# Patient Record
Sex: FEMALE | Race: BLACK OR AFRICAN AMERICAN | NOT HISPANIC OR LATINO
[De-identification: names, ages, dates, MRNs, and addresses within clinical notes are randomized per-mention and may not be internally consistent; named-entity substitution may affect disease eponyms.]

---

## 2023-10-10 PROBLEM — Z00.00 ENCOUNTER FOR PREVENTIVE HEALTH EXAMINATION: Status: ACTIVE | Noted: 2023-10-10

## 2023-10-18 ENCOUNTER — APPOINTMENT (OUTPATIENT)
Dept: NEUROLOGY | Facility: CLINIC | Age: 38
End: 2023-10-18
Payer: MEDICAID

## 2023-10-18 ENCOUNTER — NON-APPOINTMENT (OUTPATIENT)
Age: 38
End: 2023-10-18

## 2023-10-18 VITALS
HEIGHT: 61 IN | WEIGHT: 166 LBS | SYSTOLIC BLOOD PRESSURE: 104 MMHG | TEMPERATURE: 98.1 F | BODY MASS INDEX: 31.34 KG/M2 | OXYGEN SATURATION: 98 % | DIASTOLIC BLOOD PRESSURE: 70 MMHG | HEART RATE: 85 BPM

## 2023-10-18 DIAGNOSIS — A09 INFECTIOUS GASTROENTERITIS AND COLITIS, UNSPECIFIED: ICD-10-CM

## 2023-10-18 DIAGNOSIS — Z86.2 PERSONAL HISTORY OF DISEASES OF THE BLOOD AND BLOOD-FORMING ORGANS AND CERTAIN DISORDERS INVOLVING THE IMMUNE MECHANISM: ICD-10-CM

## 2023-10-18 DIAGNOSIS — M54.16 RADICULOPATHY, LUMBAR REGION: ICD-10-CM

## 2023-10-18 DIAGNOSIS — Z84.0 FAMILY HISTORY OF DISEASES OF THE SKIN AND SUBCUTANEOUS TISSUE: ICD-10-CM

## 2023-10-18 PROCEDURE — 99204 OFFICE O/P NEW MOD 45 MIN: CPT

## 2023-11-02 ENCOUNTER — OUTPATIENT (OUTPATIENT)
Dept: OUTPATIENT SERVICES | Facility: HOSPITAL | Age: 38
LOS: 1 days | End: 2023-11-02
Payer: MEDICAID

## 2023-11-02 ENCOUNTER — RESULT REVIEW (OUTPATIENT)
Age: 38
End: 2023-11-02

## 2023-11-02 ENCOUNTER — APPOINTMENT (OUTPATIENT)
Dept: MRI IMAGING | Facility: HOSPITAL | Age: 38
End: 2023-11-02

## 2023-11-02 PROCEDURE — 72148 MRI LUMBAR SPINE W/O DYE: CPT

## 2023-11-02 PROCEDURE — 72148 MRI LUMBAR SPINE W/O DYE: CPT | Mod: 26

## 2023-11-09 ENCOUNTER — APPOINTMENT (OUTPATIENT)
Dept: NEUROLOGY | Facility: CLINIC | Age: 38
End: 2023-11-09
Payer: MEDICAID

## 2023-11-09 PROCEDURE — 99214 OFFICE O/P EST MOD 30 MIN: CPT | Mod: 95

## 2024-01-30 ENCOUNTER — OUTPATIENT (OUTPATIENT)
Dept: OUTPATIENT SERVICES | Facility: HOSPITAL | Age: 39
LOS: 1 days | End: 2024-01-30
Payer: MEDICAID

## 2024-01-30 ENCOUNTER — APPOINTMENT (OUTPATIENT)
Dept: SPINE | Facility: CLINIC | Age: 39
End: 2024-01-30
Payer: MEDICAID

## 2024-01-30 VITALS
RESPIRATION RATE: 16 BRPM | TEMPERATURE: 98.6 F | SYSTOLIC BLOOD PRESSURE: 107 MMHG | HEIGHT: 62 IN | OXYGEN SATURATION: 99 % | BODY MASS INDEX: 31.83 KG/M2 | WEIGHT: 173 LBS | HEART RATE: 82 BPM | DIASTOLIC BLOOD PRESSURE: 73 MMHG

## 2024-01-30 PROCEDURE — 72114 X-RAY EXAM L-S SPINE BENDING: CPT | Mod: 26

## 2024-01-30 PROCEDURE — 72114 X-RAY EXAM L-S SPINE BENDING: CPT

## 2024-01-30 PROCEDURE — 99205 OFFICE O/P NEW HI 60 MIN: CPT

## 2024-01-30 NOTE — REVIEW OF SYSTEMS
[Numbness] : numbness [Tingling] : tingling [Abnormal Sensation] : an abnormal sensation [As Noted in HPI] : as noted in HPI [Negative] : Respiratory

## 2024-01-30 NOTE — RESULTS/DATA
[FreeTextEntry1] : PROCEDURE DATE:  11/02/2023 INTERPRETATION:  CLINICAL INDICATION: Left L5 radiculopathy, suspect disc herniation. TECHNIQUE: Multiplanar multisequence MRI of the lumbar spine was performed without the administration of intravenous contrast, according to standard protocol. COMPARISON: None available. FINDINGS: ALIGNMENT: There is a normal lumbar lordosis. There is grade 1/2 anterolisthesis of L5 on S1. VERTEBRAE: The vertebral bodies are normal in height. There is no fracture or aggressive osseous lesion. There are likely bilateral L5 pars interarticularis defects. DISCS: There is advanced degenerative loss of intervertebral disc space height at L5-S1 level. CONUS MEDULLARIS AND CAUDA EQUINA: The conus medullaris terminates at L1. There is normal appearance of the conus medullaris and cauda equina. PARAVERTEBRAL SOFT TISSUES AND VISUALIZED RETROPERITONEUM: The visualized paravertebral soft tissues appear within normal limits. Trace ascites within the pelvis, likely physiologic. EVALUATION OF INDIVIDUAL LEVELS: L1-2: No disc herniation, spinal canal or neuroforaminal stenosis. L2-3: No disc herniation, spinal canal or neuroforaminal stenosis. L3-4: No disc herniation, spinal canal or neuroforaminal stenosis. L4-5: No disc herniation, spinal canal or neuroforaminal stenosis. L5-S1: There is grade 1/2 anterolisthesis of L5-S1, with uncovering of the posterior disc and degenerative changes of the bilateral facet joints resulting in bilateral moderate to severe neuroforaminal narrowing. No spinal canal stenosis. LIMITED EVALUATION OF UPPER SACRUM AND SACROILIAC JOINTS: Unremarkable. IMPRESSION: Grade 1/2 anterolisthesis of L5 on S1, with bilateral L5 pars interarticularis defects. CT lumbar spine may be obtained for further assessment.

## 2024-01-30 NOTE — ASSESSMENT
[FreeTextEntry1] : Plan: - ALIF L5-S1 recommended  - CT lumbar spine  - PT and TERRANCE will not help BL leg symptoms.  Patient cannot tolerate due to pain and progressive neurologic deficit - risks/benefits discussed. all questions answered.  - Tentative surgery date of 2/21/24.

## 2024-01-30 NOTE — PHYSICAL EXAM
[General Appearance - Alert] : alert [General Appearance - Well Nourished] : well nourished [Oriented To Time, Place, And Person] : oriented to person, place, and time [Neck Appearance] : the appearance of the neck was normal [] : no respiratory distress [Apical Impulse] : the apical impulse was normal

## 2024-01-30 NOTE — HISTORY OF PRESENT ILLNESS
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   Today 1/30/24 Pt complains of low back pain radiating down BL legs. Pt complains of pain, numbness and tingling in both legs down to toes. Pain started in left leg now is in both legs. Pt states she cannot sleep at night d/t pain and cannot lay flat on her back. Pt cannot sit or stand for too long. Pt denies any recent falls, bowel/bladder incontinence. Pt is ambulating today without assistive device. MRI and Xray Reviewed. L5 congenital spondylolysis noted.

## 2024-02-06 ENCOUNTER — APPOINTMENT (OUTPATIENT)
Dept: CT IMAGING | Facility: CLINIC | Age: 39
End: 2024-02-06
Payer: MEDICAID

## 2024-02-06 ENCOUNTER — OUTPATIENT (OUTPATIENT)
Dept: OUTPATIENT SERVICES | Facility: HOSPITAL | Age: 39
LOS: 1 days | End: 2024-02-06

## 2024-02-06 PROCEDURE — 72131 CT LUMBAR SPINE W/O DYE: CPT | Mod: 26

## 2024-02-07 ENCOUNTER — APPOINTMENT (OUTPATIENT)
Dept: NEUROLOGY | Facility: CLINIC | Age: 39
End: 2024-02-07
Payer: MEDICAID

## 2024-02-07 VITALS
HEIGHT: 62 IN | DIASTOLIC BLOOD PRESSURE: 75 MMHG | TEMPERATURE: 98 F | HEART RATE: 80 BPM | BODY MASS INDEX: 32.39 KG/M2 | WEIGHT: 176 LBS | OXYGEN SATURATION: 99 % | SYSTOLIC BLOOD PRESSURE: 111 MMHG

## 2024-02-07 PROCEDURE — 99215 OFFICE O/P EST HI 40 MIN: CPT

## 2024-02-07 PROCEDURE — G2211 COMPLEX E/M VISIT ADD ON: CPT | Mod: NC,1L

## 2024-02-07 NOTE — ASSESSMENT
[FreeTextEntry1] : 38 year old woman with radicular back pain for about 6 months compatible with left L5 lumbar radiculopathy, likely due to intevertebral disc herniation with preserved motor function and reflexes but some residual left L5 distribution sensory loss. There is significant protrusion of the intervertebral disc related to the anterior listhesis at L5-S1 correlating with her symptoms. She is scheduled for a procedure with neurosurgery to address the anterior listhesis.  Plan Follow up spine neurosurgery Physiatry and physical therapy referral Previously declined trial of gabapentin, unlikely to benefit numbness but may be helpful for neuropathic pain if symptoms persist.  RTC as needed or if there is a change in her neurological condition

## 2024-02-07 NOTE — PHYSICAL EXAM
[FreeTextEntry1] : Vitals: unrevealing  Exam:  AO3. Normally conversant. full affect. Follows commands, names, and repeats. Good attention.  PERRL, VFF, EOMI, no nystagmus, face symmetric, TUP at midline.  Motor:  R: L: Del 5 5 Bi 5 5 Tri 5 5 Wrist Extensors 5 5 Finger abductors 5 5  5 5  HF 5 5 KE 5 5 KF 5 5 DF 5 5 PF 5 5  Tone R L UE 0 0 LE 0 0  Sensory RUE LUE RLE LLE LT + + + + Vib + + + + JPS + + + + PP + + + - Temp + + + -  left L5 distribution sensory loss to PP and temp  Reflexes:  R L Biceps 2 2 BR 2 2 Pat 2 2 AJ 2 2  TOES F F  Coordination:  R L FTN 0 0 LORAINE 0 0 HTS 0 0   Gait: normal, can heel, toe, tandem.  [Over the Past 2 Weeks, Have You Felt Down, Depressed, or Hopeless?] : 1.) Over the past 2 weeks, have you felt down, depressed, or hopeless? No [Over the Past 2 Weeks, Have You Felt Little Interest or Pleasure Doing Things?] : 2.) Over the past 2 weeks, have you felt little interest or pleasure doing things? No

## 2024-02-07 NOTE — DATA REVIEWED
[de-identified] : November 2, 2023 MRI lumbar spine without gadolinium demonstrated grade 1-2 anterolisthesis of L5-S1 with uncovering of the posterior disc and degenerative changes of the bilateral facet joints resulting in bilateral moderate to severe neuroforaminal narrowing.  No spinal canal stenosis. On my read there is significant protrusion of the intervertebral disc related to the anterior listhesis at L5-S1 correlating with her symptoms.

## 2024-02-07 NOTE — HISTORY OF PRESENT ILLNESS
[FreeTextEntry1] : 38 year old woman presenting with radicular back pain. Symptoms began several months ago with lower back pain with radiation down left leg to outer and top of right foot. There is also stiffness in the lower back. No weakness, bladder, bowel function. No recent weight loss. She walks for exercise, no heavy lifting or sentinel event.   February 7, 2024 Patient continues to experience radiating lower extremity pain, and neurogenic claudication symptoms. No bladder or bowel dysfunction or bonifacio weakness.   PMH eczema, sickle cell trait meds vitamin D NKDA SH no smoking or etoh FH sciatica in mom

## 2024-02-08 ENCOUNTER — APPOINTMENT (OUTPATIENT)
Dept: INTERNAL MEDICINE | Facility: CLINIC | Age: 39
End: 2024-02-08
Payer: MEDICAID

## 2024-02-08 ENCOUNTER — TRANSCRIPTION ENCOUNTER (OUTPATIENT)
Age: 39
End: 2024-02-08

## 2024-02-08 ENCOUNTER — NON-APPOINTMENT (OUTPATIENT)
Age: 39
End: 2024-02-08

## 2024-02-08 VITALS
BODY MASS INDEX: 32.09 KG/M2 | HEIGHT: 62 IN | HEART RATE: 77 BPM | DIASTOLIC BLOOD PRESSURE: 72 MMHG | WEIGHT: 174.38 LBS | TEMPERATURE: 98.2 F | SYSTOLIC BLOOD PRESSURE: 107 MMHG | OXYGEN SATURATION: 99 %

## 2024-02-08 PROCEDURE — 93000 ELECTROCARDIOGRAM COMPLETE: CPT

## 2024-02-08 PROCEDURE — 99213 OFFICE O/P EST LOW 20 MIN: CPT | Mod: 25

## 2024-02-09 ENCOUNTER — OUTPATIENT (OUTPATIENT)
Dept: OUTPATIENT SERVICES | Facility: HOSPITAL | Age: 39
LOS: 1 days | End: 2024-02-09
Payer: MEDICAID

## 2024-02-09 LAB
ALBUMIN SERPL ELPH-MCNC: 4.1 G/DL
ALP BLD-CCNC: 88 U/L
ALT SERPL-CCNC: 9 U/L
ANION GAP SERPL CALC-SCNC: 11 MMOL/L
APTT BLD: 30 SEC
AST SERPL-CCNC: 13 U/L
BASOPHILS # BLD AUTO: 0.05 K/UL
BASOPHILS NFR BLD AUTO: 0.7 %
BILIRUB SERPL-MCNC: 0.4 MG/DL
BUN SERPL-MCNC: 16 MG/DL
CALCIUM SERPL-MCNC: 9.5 MG/DL
CHLORIDE SERPL-SCNC: 104 MMOL/L
CHOLEST SERPL-MCNC: 211 MG/DL
CO2 SERPL-SCNC: 26 MMOL/L
CREAT SERPL-MCNC: 0.67 MG/DL
EGFR: 116 ML/MIN/1.73M2
EOSINOPHIL # BLD AUTO: 0.08 K/UL
EOSINOPHIL NFR BLD AUTO: 1.1 %
ESTIMATED AVERAGE GLUCOSE: 114 MG/DL
GLUCOSE SERPL-MCNC: 98 MG/DL
HBA1C MFR BLD HPLC: 5.6 %
HCG SERPL-MCNC: <1 MIU/ML
HCT VFR BLD CALC: 33.6 %
HDLC SERPL-MCNC: 60 MG/DL
HGB BLD-MCNC: 11.7 G/DL
IMM GRANULOCYTES NFR BLD AUTO: 0.4 %
INR PPP: 0.98 RATIO
LDLC SERPL CALC-MCNC: 138 MG/DL
LYMPHOCYTES # BLD AUTO: 2.45 K/UL
LYMPHOCYTES NFR BLD AUTO: 33.6 %
MAN DIFF?: NORMAL
MCHC RBC-ENTMCNC: 26.8 PG
MCHC RBC-ENTMCNC: 34.8 GM/DL
MCV RBC AUTO: 77.1 FL
MONOCYTES # BLD AUTO: 0.65 K/UL
MONOCYTES NFR BLD AUTO: 8.9 %
NEUTROPHILS # BLD AUTO: 4.03 K/UL
NEUTROPHILS NFR BLD AUTO: 55.3 %
NONHDLC SERPL-MCNC: 151 MG/DL
PLATELET # BLD AUTO: 254 K/UL
POTASSIUM SERPL-SCNC: 4 MMOL/L
PROT SERPL-MCNC: 7.1 G/DL
PT BLD: 11.1 SEC
RBC # BLD: 4.36 M/UL
RBC # FLD: 15.8 %
SODIUM SERPL-SCNC: 141 MMOL/L
TRIGL SERPL-MCNC: 73 MG/DL
WBC # FLD AUTO: 7.29 K/UL

## 2024-02-09 PROCEDURE — 71045 X-RAY EXAM CHEST 1 VIEW: CPT

## 2024-02-09 PROCEDURE — 71045 X-RAY EXAM CHEST 1 VIEW: CPT | Mod: 26

## 2024-02-09 NOTE — ASSESSMENT
[Patient Optimized for Surgery] : Patient optimized for surgery [No Further Testing Recommended] : no further testing recommended [FreeTextEntry4] : RCRI class 1, Casillas score 0.1% Patient is low risk for low risk procedure.  Labwork collected today - besides HLD (LDL of 132), labwork normal  EKG done NSR

## 2024-02-09 NOTE — HISTORY OF PRESENT ILLNESS
[No Pertinent Cardiac History] : no history of aortic stenosis, atrial fibrillation, coronary artery disease, recent myocardial infarction, or implantable device/pacemaker [No Pertinent Pulmonary History] : no history of asthma, COPD, sleep apnea, or smoking [No Adverse Anesthesia Reaction] : no adverse anesthesia reaction in self or family member [(Patient denies any chest pain, claudication, dyspnea on exertion, orthopnea, palpitations or syncope)] : Patient denies any chest pain, claudication, dyspnea on exertion, orthopnea, palpitations or syncope [Excellent (>10 METs)] : Excellent (>10 METs) [Chronic Anticoagulation] : no chronic anticoagulation [Chronic Kidney Disease] : no chronic kidney disease [Diabetes] : no diabetes [FreeTextEntry1] : ALIF L5-S1 [FreeTextEntry2] : 2/21/24 [FreeTextEntry3] : Dr. Sebastien Contreras [FreeTextEntry4] : 38F with no significant PMHX other than congenital spondylosis who presents for preoperative clearance for surgery.  She feels well today other than some low back pain.

## 2024-02-22 ENCOUNTER — APPOINTMENT (OUTPATIENT)
Dept: NEUROSURGERY | Facility: CLINIC | Age: 39
End: 2024-02-22

## 2024-03-21 ENCOUNTER — NON-APPOINTMENT (OUTPATIENT)
Age: 39
End: 2024-03-21

## 2024-03-25 ENCOUNTER — APPOINTMENT (OUTPATIENT)
Dept: NEUROSURGERY | Facility: CLINIC | Age: 39
End: 2024-03-25
Payer: MEDICAID

## 2024-03-25 VITALS
WEIGHT: 174 LBS | DIASTOLIC BLOOD PRESSURE: 74 MMHG | BODY MASS INDEX: 32.02 KG/M2 | HEIGHT: 62 IN | HEART RATE: 97 BPM | SYSTOLIC BLOOD PRESSURE: 111 MMHG | TEMPERATURE: 97.5 F | OXYGEN SATURATION: 98 % | RESPIRATION RATE: 18 BRPM

## 2024-03-25 PROCEDURE — 99215 OFFICE O/P EST HI 40 MIN: CPT

## 2024-03-29 ENCOUNTER — APPOINTMENT (OUTPATIENT)
Dept: INTERNAL MEDICINE | Facility: CLINIC | Age: 39
End: 2024-03-29

## 2024-04-05 ENCOUNTER — APPOINTMENT (OUTPATIENT)
Dept: INTERNAL MEDICINE | Facility: CLINIC | Age: 39
End: 2024-04-05
Payer: MEDICAID

## 2024-04-05 VITALS
RESPIRATION RATE: 17 BRPM | WEIGHT: 174 LBS | HEIGHT: 62 IN | OXYGEN SATURATION: 98 % | BODY MASS INDEX: 32.02 KG/M2 | HEART RATE: 87 BPM | SYSTOLIC BLOOD PRESSURE: 109 MMHG | DIASTOLIC BLOOD PRESSURE: 71 MMHG | TEMPERATURE: 97.9 F

## 2024-04-05 DIAGNOSIS — Z01.818 ENCOUNTER FOR OTHER PREPROCEDURAL EXAMINATION: ICD-10-CM

## 2024-04-05 DIAGNOSIS — D57.3 SICKLE-CELL TRAIT: ICD-10-CM

## 2024-04-05 DIAGNOSIS — L40.9 PSORIASIS, UNSPECIFIED: ICD-10-CM

## 2024-04-05 DIAGNOSIS — E66.9 OBESITY, UNSPECIFIED: ICD-10-CM

## 2024-04-05 DIAGNOSIS — K29.60 OTHER GASTRITIS W/OUT BLEEDING: ICD-10-CM

## 2024-04-05 DIAGNOSIS — Z83.3 FAMILY HISTORY OF DIABETES MELLITUS: ICD-10-CM

## 2024-04-05 PROCEDURE — 99214 OFFICE O/P EST MOD 30 MIN: CPT

## 2024-04-05 RX ORDER — DUPILUMAB 200 MG/1.14ML
INJECTION, SOLUTION SUBCUTANEOUS
Refills: 0 | Status: ACTIVE | COMMUNITY

## 2024-04-05 NOTE — HISTORY OF PRESENT ILLNESS
[No Pertinent Cardiac History] : no history of aortic stenosis, atrial fibrillation, coronary artery disease, recent myocardial infarction, or implantable device/pacemaker [No Pertinent Pulmonary History] : no history of asthma, COPD, sleep apnea, or smoking [(Patient denies any chest pain, claudication, dyspnea on exertion, orthopnea, palpitations or syncope)] : Patient denies any chest pain, claudication, dyspnea on exertion, orthopnea, palpitations or syncope [Moderate (4-6 METs)] : Moderate (4-6 METs) [Self] : no previous adverse anesthesia reaction [Chronic Anticoagulation] : no chronic anticoagulation [Chronic Kidney Disease] : no chronic kidney disease [Diabetes] : no diabetes [FreeTextEntry1] : 4-S1 posterior instrumented fusion with TLIF from right side [FreeTextEntry2] : 4/24/24 [FreeTextEntry3] : Dr. Newell [FreeTextEntry4] : Ms. KRISH CHARLES is a 38-year-old female with history of lumbo-sacral stenosis, psoriasis on Dupixent, and sickle trait resulting in neurogenic claudication and low back pain presenting today to the [facility] prior to 4-S1 posterior instrumented fusion with TLIF from right side. [FreeTextEntry8] : lives in 5th floor walk up, able to walk unlimited distance

## 2024-04-05 NOTE — ASSESSMENT
[High Risk Surgery - Intraperitoneal, Intrathoracic or Supringuinal Vascular Procedures] : High Risk Surgery - Intraperitoneal, Intrathoracic or Supringuinal Vascular Procedures - No (0) [Ischemic Heart Disease] : Ischemic Heart Disease - No (0) [Congestive Heart Failure] : Congestive Heart Failure - No (0) [Prior Cerebrovascular Accident or TIA] : Prior Cerebrovascular Accident or TIA - No (0) [Creatinine >= 2mg/dL (1 Point)] : Creatinine >= 2mg/dL - No (0) [Insulin-dependent Diabetic (1 Point)] : Insulin-dependent Diabetic - No (0) [0] : 0 , RCRI Class: I, Risk of Post-Op Cardiac Complications: 3.9%, 95% CI for Risk Estimate: 2.8% - 5.4% [Patient Optimized for Surgery] : Patient optimized for surgery [FreeTextEntry4] : Ms. KRISH CHARLES is a 38-year-old female with history of lumbo-sacral stenosis, psoriasis on Dupixent, and sickle trait resulting in neurogenic claudication and low back pain presenting today to the [facility] prior to 4-S1 posterior instrumented fusion with TLIF from right side.  Recent ECG, and labs independently reviewed, notable only for mildly elevated LDL. She is considered low risk for intermediate risk procedure.

## 2024-04-05 NOTE — PHYSICAL EXAM
[No Acute Distress] : no acute distress [Well-Appearing] : well-appearing [Normal Sclera/Conjunctiva] : normal sclera/conjunctiva [Normal Outer Ear/Nose] : the outer ears and nose were normal in appearance [No Edema] : there was no peripheral edema [Soft] : abdomen soft [Non Tender] : non-tender [Normal] : affect was normal and insight and judgment were intact

## 2024-04-08 LAB — HCG UR QL: NEGATIVE

## 2024-04-16 ENCOUNTER — APPOINTMENT (OUTPATIENT)
Dept: NEUROSURGERY | Facility: CLINIC | Age: 39
End: 2024-04-16
Payer: MEDICAID

## 2024-04-16 PROCEDURE — 99215 OFFICE O/P EST HI 40 MIN: CPT

## 2024-04-17 NOTE — HISTORY OF PRESENT ILLNESS
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 In 2022 back pain started radiating down both legs to feet.   1/30/24 Pt complains of low back pain radiating down BL legs. Pt complains of pain, numbness and tingling in both legs down to toes. Pain started in left leg now is in both legs. Pt states she cannot sleep at night d/t pain and cannot lay flat on her back. Pt cannot sit or stand for too long. Pt denies any recent falls, bowel/bladder incontinence. Pt is ambulating today without assistive device. MRI and Xray Reviewed. L5 congenital spondylolysis reviewed.  She had been scheduled for L5-S1 ALIF with Dr. Contreras and presents today to establish care.   Lower back pain is constant. Reports she stumbles often with ambulation, denies any falls. Denies weakness in legs. Does not take anything pain. Pain worse with ambulation, improves with rest.

## 2024-04-17 NOTE — REASON FOR VISIT
[Home] : at home, [unfilled] , at the time of the visit. [Medical Office: ([facility])___] : at the medical office located in  [Patient] : the patient [Follow-Up: _____] : a [unfilled] follow-up visit [FreeTextEntry1] : Patient with neurogenic claudication and low back pain due to lumbo-sacral stenosis, instability from pars fractures, and spondylolisthesis at L5-S1. Patient has significant b/l leg paresthesias and numbness worse on the right side. Symptoms become worse with walking. She has constant back pain. She has failed conservative measures over many years of worsening back pain which has now progressed to neurological worsening with loss of sensory function.

## 2024-04-17 NOTE — ADDENDUM
[FreeTextEntry1] : Patient with neurogenic claudication and low back pain due to lumbo-sacral stenosis, instability from pars fractures, and spondylolisthesis at L5-S1. Patient has significant b/l leg paresthesias and numbness worse on the right side. Symptoms become worse with walking. She has constant back pain. She has failed conservative measures over the year. Plan for L4-S1 posterior instrumented fusion with TLIF from right side. Risks of surgery including but not limited to CSF leak, nerve injury, paralysis, persistent pain, need for re-operation discussed. Patient understands and wishes to proceed with surgery.

## 2024-04-17 NOTE — ASSESSMENT
[FreeTextEntry1] : CT lumbar from 2/6/24 reviewed by Dr. Newell with patient showing bilateral chronic spondylolysis defects at L5 with grade 2 anterolisthesis.  Recommend L4-S1 decompression and fusion, TLIF Will set up patient at [facility] center for medical clearance  Will plan for surgery date 4/24/24  Risks, benefits and alternatives to surgery discussed. Multiple questions answered to patients satisfaction. Risks include but is not limited to infection, no resolution of symptoms/device failure. Education provided regarding pre-operative clearance requirements.  I, Dr. Newell, personally performed the evaluation and management (E/M) services for this new patient.  That E/M includes conducting the initial examination, assessing all conditions, and establishing the plan of care.  Today, my ACP, Shira Tapia, was here to observe my evaluation and management services for this patient to be followed going forward.   Patient and patient's family verbalize agreement and understanding with plan of care.

## 2024-04-17 NOTE — ASSESSMENT
[FreeTextEntry1] : CT lumbar from 2/6/24 and MRI lumbar from 11/2/2023 reviewed by Dr. Newell with patient showing bilateral chronic spondylolysis defects at L5 with grade 2 anterolisthesis. The severe worsening back pain and neurogenic claudication that Ms. Velez has corresponds to her obvious radiographic instability due to pars fractures bilaterally at L5. The spondylolysis at L5-S1 results in severe spinal stenosis at L5-S1 foramina bilaterally which severely compresses the nerve roots at that level. She is at risk for worsening spondylolisthesis which could result in acute neurological deterioration since her L5-S1 joint is unstable. This structural defects in the spine cannot be corrected by further physical therapy or pain management. Instrumented stabilization of the spine is imperative in an attempt to avoid futher neurological worsening.  Plan for L4-S1 posterior instrumented fusion with TLIF from right side at L5-S1. Risks of surgery including but not limited to CSF leak, nerve injury, paralysis, persistent pain, need for re-operation discussed. Patient understands and wishes to proceed with surgery.Will plan for surgery date 4/24/24  Risks, benefits and alternatives to surgery discussed. Multiple questions answered to patients satisfaction. Risks include but is not limited to infection, no resolution of symptoms/device failure. Education provided regarding pre-operative clearance requirements.  I, Dr. Newell, personally performed the evaluation and management (E/M) services for this new patient.  That E/M includes conducting the initial examination, assessing all conditions, and establishing the plan of care.  Today, my ACP, Carol Witt, was here to observe my evaluation and management services for this patient to be followed going forward.   Patient and patient's family verbalize agreement and understanding with plan of care.

## 2024-04-17 NOTE — PHYSICAL EXAM
[Motor Tone] : muscle tone was normal in all four extremities [Motor Strength] : muscle strength was normal in all four extremities [5] : S1 ankle flexors 5/5 [Abnormal Walk] : normal gait [Balance] : balance was intact [Intact] : all motor groups within normal limits of strength and tone bilaterally [FreeTextEntry7] : sensory loss b/l legs with paresthesias R>L [Antalgic] : antalgic [Neck Appearance] : the appearance of the neck was normal [] : no respiratory distress [FreeTextEntry1] : Antalgic gait [Skin Color & Pigmentation] : normal skin color and pigmentation

## 2024-04-17 NOTE — DATA REVIEWED
[de-identified] :   	 EXAM: [id number] - CT LUMBAR SPINE  - ORDERED BY: MATTHEW SAHU   PROCEDURE DATE:  02/06/2024    INTERPRETATION:  LUMBOSACRAL SPINE CT  CLINICAL INFORMATION: Low back pain. Spondylolisthesis. Preoperative exam. TECHNIQUE: Axial CT images were obtained of the lumbosacral spine with coronal and sagittal reconstructions. Comparison is made to prior study from 11/2/2023.  FINDINGS:  ALIGNMENT: Grade 2 anterolisthesis at L5-S1  DISC SPACES: Bilateral chronic spondylolysis defects at L5 with grade 2 anterolisthesis. Severe disc space narrowing with gas in the disc space and endplate sclerosis along with endplate osteophyte formation and disc bulging resulting in severe bilateral foraminal narrowing.  SI JOINTS: Minimal SI joint arthrosis. INTRAABDOMINAL AND INTRAPELVIC SOFT TISSUES: Normal  IMPRESSION: Bilateral chronic spondylolysis defects at L5 with grade 2 anterolisthesis, disc space narrowing and disc bulging resulting in severe bilateral foraminal narrowing.  --- End of Report ---       WALE ZULUAGA MD; Attending Radiologist This document has been electronically signed. Feb 7 2024 10:51PM

## 2024-04-17 NOTE — PHYSICAL EXAM
[General Appearance - Alert] : alert [General Appearance - In No Acute Distress] : in no acute distress [Sclera] : the sclera and conjunctiva were normal [Oriented To Time, Place, And Person] : oriented to person, place, and time [Outer Ear] : the ears and nose were normal in appearance [Neck Appearance] : the appearance of the neck was normal [] : no respiratory distress [FreeTextEntry6] : Motor: muscle tone was normal in all four extremities and muscle strength was normal in all four extremities.  Right: C5 deltoid 5/5, C5 Biceps 5/5, C6 wrist extensors 5/5, C7 triceps 5/5, C8 finger flexors 5/5, L2 Iliopsoas 5/5, L3 quadriceps 5/5, L4/5 ankle dorsiflexors 5/5, S1 ankle flexors 5/5. Left: C5 deltoid 5/5, C5 Biceps 5/5, C6 wrist extensors 5/5, C7 triceps 5/5, C8 finger flexors 5/5, L2 Iliopsoas 5/5, L3 quadriceps 5/5, L4/5 ankle dorsiflexors 5/5, S1 ankle flexors 5/5.   Sensory exam:. sensory loss b/l legs with paresthesias R>L.   Coordination:. balance was intact.   Spine:  Gait: antalgic   Motor Exam: all motor groups within normal limits of strength and tone bilaterally. Neck: the appearance of the neck was normal. Pulmonary: no respiratory distress. Back:. TTP low back. Musculoskeletal:. Antalgic gait. Skin: normal skin color and pigmentation.

## 2024-04-17 NOTE — DATA REVIEWED
[de-identified] : Lumbar spine CT 2/6/24 demonstrates Bilateral chronic spondylolysis defects at L5 with grade 2 anterolisthesis, disc space narrowing and disc bulging resulting in severe bilateral foraminal narrowing. [de-identified] : I have reviewed the most recent MRI 11/2/23 at [facility] which shows grade 1/2 anterolisthesis of L5-S1, with uncovering of the posterior disc and degenerative changes of the bilateral facet joints resulting in bilateral moderate to severe neuroforaminal narrowing. No spinal canal stenosis.

## 2024-04-17 NOTE — HISTORY OF PRESENT ILLNESS
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 In 2022 back pain started radiating down both legs to feet.   1/30/24 Pt complains of low back pain radiating down BL legs. Pt complains of pain, numbness and tingling in both legs down to toes. Pain started in left leg now is in both legs. Pt states she cannot sleep at night d/t pain and cannot lay flat on her back. Pt cannot sit or stand for too long. Pt denies any recent falls, bowel/bladder incontinence. Pt is ambulating today without assistive device. MRI and Xray Reviewed. L5 congenital spondylolysis reviewed.  She had been scheduled for L5-S1 ALIF with Dr. Contreras and presents today to establish care.   Lower back pain is constant. Reports she stumbles often with ambulation, denies any falls. Denies weakness in legs. Does not take anything pain. Pain worse with ambulation, improves with rest.

## 2024-04-23 ENCOUNTER — TRANSCRIPTION ENCOUNTER (OUTPATIENT)
Age: 39
End: 2024-04-23

## 2024-04-23 VITALS
SYSTOLIC BLOOD PRESSURE: 111 MMHG | OXYGEN SATURATION: 96 % | DIASTOLIC BLOOD PRESSURE: 77 MMHG | HEART RATE: 95 BPM | RESPIRATION RATE: 16 BRPM | WEIGHT: 177.25 LBS | TEMPERATURE: 98 F | HEIGHT: 61 IN

## 2024-04-23 RX ORDER — POVIDONE-IODINE 5 %
1 AEROSOL (ML) TOPICAL ONCE
Refills: 0 | Status: COMPLETED | OUTPATIENT
Start: 2024-04-24 | End: 2024-04-24

## 2024-04-23 NOTE — PATIENT PROFILE ADULT - FALL HARM RISK - UNIVERSAL INTERVENTIONS
Bed in lowest position, wheels locked, appropriate side rails in place/Call bell, personal items and telephone in reach/Instruct patient to call for assistance before getting out of bed or chair/Non-slip footwear when patient is out of bed/Amanda to call system/Physically safe environment - no spills, clutter or unnecessary equipment/Purposeful Proactive Rounding/Room/bathroom lighting operational, light cord in reach

## 2024-04-24 ENCOUNTER — APPOINTMENT (OUTPATIENT)
Dept: NEUROSURGERY | Facility: HOSPITAL | Age: 39
End: 2024-04-24

## 2024-04-24 ENCOUNTER — INPATIENT (INPATIENT)
Facility: HOSPITAL | Age: 39
LOS: 4 days | Discharge: HOME | DRG: 455 | End: 2024-04-29
Attending: NEUROLOGICAL SURGERY | Admitting: NEUROLOGICAL SURGERY
Payer: MEDICAID

## 2024-04-24 DIAGNOSIS — Z98.891 HISTORY OF UTERINE SCAR FROM PREVIOUS SURGERY: Chronic | ICD-10-CM

## 2024-04-24 LAB
ADD ON TEST-SPECIMEN IN LAB: SIGNIFICANT CHANGE UP
ANION GAP SERPL CALC-SCNC: 11 MMOL/L — SIGNIFICANT CHANGE UP (ref 5–17)
BLD GP AB SCN SERPL QL: NEGATIVE — SIGNIFICANT CHANGE UP
BUN SERPL-MCNC: 8 MG/DL — SIGNIFICANT CHANGE UP (ref 7–23)
CALCIUM SERPL-MCNC: 8.7 MG/DL — SIGNIFICANT CHANGE UP (ref 8.4–10.5)
CHLORIDE SERPL-SCNC: 105 MMOL/L — SIGNIFICANT CHANGE UP (ref 96–108)
CO2 SERPL-SCNC: 21 MMOL/L — LOW (ref 22–31)
CREAT SERPL-MCNC: 0.56 MG/DL — SIGNIFICANT CHANGE UP (ref 0.5–1.3)
EGFR: 121 ML/MIN/1.73M2 — SIGNIFICANT CHANGE UP
GLUCOSE SERPL-MCNC: 161 MG/DL — HIGH (ref 70–99)
HCT VFR BLD CALC: 30.1 % — LOW (ref 34.5–45)
HGB BLD-MCNC: 10.7 G/DL — LOW (ref 11.5–15.5)
MAGNESIUM SERPL-MCNC: 1.8 MG/DL — SIGNIFICANT CHANGE UP (ref 1.6–2.6)
MCHC RBC-ENTMCNC: 27.4 PG — SIGNIFICANT CHANGE UP (ref 27–34)
MCHC RBC-ENTMCNC: 35.5 GM/DL — SIGNIFICANT CHANGE UP (ref 32–36)
MCV RBC AUTO: 77.2 FL — LOW (ref 80–100)
NRBC # BLD: 0 /100 WBCS — SIGNIFICANT CHANGE UP (ref 0–0)
PHOSPHATE SERPL-MCNC: 3 MG/DL — SIGNIFICANT CHANGE UP (ref 2.5–4.5)
PLATELET # BLD AUTO: 224 K/UL — SIGNIFICANT CHANGE UP (ref 150–400)
POTASSIUM SERPL-MCNC: 4.4 MMOL/L — SIGNIFICANT CHANGE UP (ref 3.5–5.3)
POTASSIUM SERPL-SCNC: 4.4 MMOL/L — SIGNIFICANT CHANGE UP (ref 3.5–5.3)
RBC # BLD: 3.9 M/UL — SIGNIFICANT CHANGE UP (ref 3.8–5.2)
RBC # FLD: 15.1 % — HIGH (ref 10.3–14.5)
RH IG SCN BLD-IMP: POSITIVE — SIGNIFICANT CHANGE UP
SODIUM SERPL-SCNC: 137 MMOL/L — SIGNIFICANT CHANGE UP (ref 135–145)
WBC # BLD: 11.22 K/UL — HIGH (ref 3.8–10.5)
WBC # FLD AUTO: 11.22 K/UL — HIGH (ref 3.8–10.5)

## 2024-04-24 PROCEDURE — 63052 LAM FACETC/FRMT ARTHRD LUM 1: CPT

## 2024-04-24 PROCEDURE — 22853 INSJ BIOMECHANICAL DEVICE: CPT

## 2024-04-24 PROCEDURE — 99024 POSTOP FOLLOW-UP VISIT: CPT

## 2024-04-24 PROCEDURE — 61783 SCAN PROC SPINAL: CPT | Mod: 59

## 2024-04-24 PROCEDURE — 22840 INSERT SPINE FIXATION DEVICE: CPT

## 2024-04-24 PROCEDURE — 22633 ARTHRD CMBN 1NTRSPC LUMBAR: CPT

## 2024-04-24 DEVICE — SCREW SERRATO POLY 6.5X40MM: Type: IMPLANTABLE DEVICE | Status: FUNCTIONAL

## 2024-04-24 DEVICE — SURGIFOAM PAD 8CM X 12.5CM X 10MM (100): Type: IMPLANTABLE DEVICE | Status: FUNCTIONAL

## 2024-04-24 DEVICE — SURGIFLO HEMOSTATIC MATRIX KIT: Type: IMPLANTABLE DEVICE | Status: FUNCTIONAL

## 2024-04-24 DEVICE — GRAFT VITOSIS BIMODAL 10CC: Type: IMPLANTABLE DEVICE | Status: FUNCTIONAL

## 2024-04-24 DEVICE — IMPLANTABLE DEVICE: Type: IMPLANTABLE DEVICE | Status: FUNCTIONAL

## 2024-04-24 DEVICE — ROD 60MM: Type: IMPLANTABLE DEVICE | Status: FUNCTIONAL

## 2024-04-24 DEVICE — GRAFT BONE INFUSE KIT SM: Type: IMPLANTABLE DEVICE | Status: FUNCTIONAL

## 2024-04-24 DEVICE — SCREW BLOCKER BONE XIA: Type: IMPLANTABLE DEVICE | Status: FUNCTIONAL

## 2024-04-24 RX ORDER — CHLORHEXIDINE GLUCONATE 213 G/1000ML
1 SOLUTION TOPICAL ONCE
Refills: 0 | Status: COMPLETED | OUTPATIENT
Start: 2024-04-24 | End: 2024-04-24

## 2024-04-24 RX ORDER — HYDROMORPHONE HYDROCHLORIDE 2 MG/ML
0.5 INJECTION INTRAMUSCULAR; INTRAVENOUS; SUBCUTANEOUS ONCE
Refills: 0 | Status: DISCONTINUED | OUTPATIENT
Start: 2024-04-24 | End: 2024-04-24

## 2024-04-24 RX ORDER — APREPITANT 80 MG/1
40 CAPSULE ORAL ONCE
Refills: 0 | Status: COMPLETED | OUTPATIENT
Start: 2024-04-24 | End: 2024-04-24

## 2024-04-24 RX ORDER — ACETAMINOPHEN 500 MG
1000 TABLET ORAL EVERY 6 HOURS
Refills: 0 | Status: COMPLETED | OUTPATIENT
Start: 2024-04-24 | End: 2024-04-26

## 2024-04-24 RX ORDER — MAGNESIUM SULFATE 500 MG/ML
2 VIAL (ML) INJECTION ONCE
Refills: 0 | Status: COMPLETED | OUTPATIENT
Start: 2024-04-24 | End: 2024-04-24

## 2024-04-24 RX ORDER — METHOCARBAMOL 500 MG/1
500 TABLET, FILM COATED ORAL EVERY 8 HOURS
Refills: 0 | Status: DISCONTINUED | OUTPATIENT
Start: 2024-04-24 | End: 2024-04-29

## 2024-04-24 RX ORDER — METOCLOPRAMIDE HCL 10 MG
10 TABLET ORAL ONCE
Refills: 0 | Status: COMPLETED | OUTPATIENT
Start: 2024-04-24 | End: 2024-04-24

## 2024-04-24 RX ORDER — ACETAMINOPHEN 500 MG
1000 TABLET ORAL ONCE
Refills: 0 | Status: COMPLETED | OUTPATIENT
Start: 2024-04-24 | End: 2024-04-24

## 2024-04-24 RX ORDER — GABAPENTIN 400 MG/1
300 CAPSULE ORAL EVERY 8 HOURS
Refills: 0 | Status: DISCONTINUED | OUTPATIENT
Start: 2024-04-24 | End: 2024-04-29

## 2024-04-24 RX ORDER — OXYCODONE HYDROCHLORIDE 5 MG/1
10 TABLET ORAL EVERY 4 HOURS
Refills: 0 | Status: DISCONTINUED | OUTPATIENT
Start: 2024-04-24 | End: 2024-04-29

## 2024-04-24 RX ORDER — OXYCODONE HYDROCHLORIDE 5 MG/1
5 TABLET ORAL EVERY 4 HOURS
Refills: 0 | Status: DISCONTINUED | OUTPATIENT
Start: 2024-04-24 | End: 2024-04-29

## 2024-04-24 RX ORDER — PROCHLORPERAZINE MALEATE 5 MG
2.5 TABLET ORAL ONCE
Refills: 0 | Status: COMPLETED | OUTPATIENT
Start: 2024-04-24 | End: 2024-04-24

## 2024-04-24 RX ORDER — SENNA PLUS 8.6 MG/1
2 TABLET ORAL AT BEDTIME
Refills: 0 | Status: DISCONTINUED | OUTPATIENT
Start: 2024-04-24 | End: 2024-04-29

## 2024-04-24 RX ORDER — CEFAZOLIN SODIUM 1 G
2000 VIAL (EA) INJECTION EVERY 8 HOURS
Refills: 0 | Status: COMPLETED | OUTPATIENT
Start: 2024-04-24 | End: 2024-04-24

## 2024-04-24 RX ORDER — POLYETHYLENE GLYCOL 3350 17 G/17G
17 POWDER, FOR SOLUTION ORAL DAILY
Refills: 0 | Status: DISCONTINUED | OUTPATIENT
Start: 2024-04-24 | End: 2024-04-26

## 2024-04-24 RX ORDER — SODIUM CHLORIDE 9 MG/ML
1000 INJECTION INTRAMUSCULAR; INTRAVENOUS; SUBCUTANEOUS
Refills: 0 | Status: DISCONTINUED | OUTPATIENT
Start: 2024-04-24 | End: 2024-04-25

## 2024-04-24 RX ORDER — ONDANSETRON 8 MG/1
4 TABLET, FILM COATED ORAL EVERY 8 HOURS
Refills: 0 | Status: COMPLETED | OUTPATIENT
Start: 2024-04-24 | End: 2024-04-26

## 2024-04-24 RX ADMIN — Medication 1000 MILLIGRAM(S): at 06:29

## 2024-04-24 RX ADMIN — ONDANSETRON 4 MILLIGRAM(S): 8 TABLET, FILM COATED ORAL at 18:11

## 2024-04-24 RX ADMIN — OXYCODONE HYDROCHLORIDE 5 MILLIGRAM(S): 5 TABLET ORAL at 17:40

## 2024-04-24 RX ADMIN — Medication 1000 MILLIGRAM(S): at 19:53

## 2024-04-24 RX ADMIN — Medication 25 GRAM(S): at 17:40

## 2024-04-24 RX ADMIN — Medication 1000 MILLIGRAM(S): at 19:54

## 2024-04-24 RX ADMIN — SENNA PLUS 2 TABLET(S): 8.6 TABLET ORAL at 22:29

## 2024-04-24 RX ADMIN — Medication 1 APPLICATION(S): at 06:54

## 2024-04-24 RX ADMIN — OXYCODONE HYDROCHLORIDE 10 MILLIGRAM(S): 5 TABLET ORAL at 16:07

## 2024-04-24 RX ADMIN — HYDROMORPHONE HYDROCHLORIDE 0.5 MILLIGRAM(S): 2 INJECTION INTRAMUSCULAR; INTRAVENOUS; SUBCUTANEOUS at 15:52

## 2024-04-24 RX ADMIN — OXYCODONE HYDROCHLORIDE 5 MILLIGRAM(S): 5 TABLET ORAL at 18:17

## 2024-04-24 RX ADMIN — APREPITANT 40 MILLIGRAM(S): 80 CAPSULE ORAL at 06:29

## 2024-04-24 RX ADMIN — SODIUM CHLORIDE 75 MILLILITER(S): 9 INJECTION INTRAMUSCULAR; INTRAVENOUS; SUBCUTANEOUS at 15:56

## 2024-04-24 RX ADMIN — OXYCODONE HYDROCHLORIDE 10 MILLIGRAM(S): 5 TABLET ORAL at 15:37

## 2024-04-24 RX ADMIN — GABAPENTIN 300 MILLIGRAM(S): 400 CAPSULE ORAL at 22:29

## 2024-04-24 RX ADMIN — HYDROMORPHONE HYDROCHLORIDE 0.5 MILLIGRAM(S): 2 INJECTION INTRAMUSCULAR; INTRAVENOUS; SUBCUTANEOUS at 16:45

## 2024-04-24 RX ADMIN — Medication 100 MILLIGRAM(S): at 20:41

## 2024-04-24 RX ADMIN — HYDROMORPHONE HYDROCHLORIDE 0.5 MILLIGRAM(S): 2 INJECTION INTRAMUSCULAR; INTRAVENOUS; SUBCUTANEOUS at 17:00

## 2024-04-24 RX ADMIN — HYDROMORPHONE HYDROCHLORIDE 0.5 MILLIGRAM(S): 2 INJECTION INTRAMUSCULAR; INTRAVENOUS; SUBCUTANEOUS at 16:07

## 2024-04-24 RX ADMIN — METHOCARBAMOL 500 MILLIGRAM(S): 500 TABLET, FILM COATED ORAL at 22:29

## 2024-04-24 RX ADMIN — Medication 10 MILLIGRAM(S): at 20:39

## 2024-04-24 NOTE — H&P ADULT - NSHPPHYSICALEXAM_GEN_ALL_CORE
Constitutional:   37 y/o  female awake, alert in no acute distress.  Eyes:  Sclera anicteric, conjunctiva noninjected.  ENMT: Oropharyngeal mucosa moist, pink. Tongue midline.    Neck: Neck supple, FROM.  No appreciable lymphadenopathy.  Respiratory: Clear to auscultation bilaterally.  No rales, rhonchi, wheezes.  Cardiovascular: Regular rate and rhythm.  S1, S2 heard.  Gastrointestinal:  Soft, nontender, nondistended.  +BS.  Genitourinary:  Deferred.  Rectal: Deferred.  Vascular: Extremities warm, no ulcers, no discoloration of skin.   Neurological: Gen: AA&O x 3, conversant, appropriate.      CN II-XII grossly intact.    Motor: COTA x 4, 5/5 throughout UE/LE.    Sens: Sensation intact to light touch throughout.    Plantar downgoing bilaterally.  No clonus.      No pronator drift, no dysmetria.  Skin: Warm, dry, no erythema.

## 2024-04-24 NOTE — H&P ADULT - ASSESSMENT
37 y/o female with sickle cell trait and psoriasis, neurogenic claudication and low back pain due to lumbo-sacral stenosis, instability from pars fractures, and spondylolisthesis at L5-S1. Patient has significant b/l leg paresthesias and numbness worse on the right side. Symptoms become worse with walking. She has constant back pain. She has failed conservative measures over the year. Plan for L4-S1 posterior instrumented fusion with TLIF from right side. Risks of surgery including but not limited to CSF leak, nerve injury, paralysis, persistent pain, need for re-operation discussed. Patient understands and wishes to proceed with surgery.    - NPO  - 3M  - Perioperative antibiotics  - SCDs  - admit to regional vs tele postop    All above d/w Dr. Newell.

## 2024-04-24 NOTE — H&P ADULT - NSICDXPASTMEDICALHX_GEN_ALL_CORE_FT
PAST MEDICAL HISTORY:  History of sickle cell trait     Infectious gastroenteritis     Spondylolysis of lumbar region

## 2024-04-24 NOTE — H&P ADULT - NSHPLABSRESULTS_GEN_ALL_CORE
< from: MR Lumbar Spine No Cont (11.02.23 @ 19:29) >    Grade 1/2 anterolisthesis of L5 on S1, with bilateral L5 pars   interarticularis defects.    < end of copied text >    < from: CT Lumbar Spine No Cont (02.06.24 @ 16:42) >    Bilateral chronic spondylolysis defects at L5 with grade 2   anterolisthesis, disc space narrowing and disc bulging resulting in   severe bilateral foraminal narrowing.    < end of copied text >

## 2024-04-24 NOTE — PROGRESS NOTE ADULT - SUBJECTIVE AND OBJECTIVE BOX
NEUROSURGERY POST OP NOTE:    POD# 0 S/P L5-S1 TLIF    S: Postop patient denies pain and denies any new extremity numbness or weakness. TLSO ordered and delivered to bedside.      T(C): 35.8 (04-24-24 @ 15:07), Max: 36.4 (04-24-24 @ 07:36)  HR: 64 (04-24-24 @ 16:07) (64 - 95)  BP: 105/61 (04-24-24 @ 16:07) (96/57 - 128/79)  RR: 12 (04-24-24 @ 16:07) (9 - 26)  SpO2: 99% (04-24-24 @ 16:07) (96% - 100%)      04-24-24 @ 07:01  -  04-24-24 @ 17:23  --------------------------------------------------------  IN: 150 mL / OUT: 140 mL / NET: 10 mL        acetaminophen     Tablet .. 1000 milliGRAM(s) Oral every 6 hours  ceFAZolin   IVPB 2000 milliGRAM(s) IV Intermittent every 8 hours  gabapentin 300 milliGRAM(s) Oral every 8 hours  magnesium sulfate  IVPB 2 Gram(s) IV Intermittent once  methocarbamol 500 milliGRAM(s) Oral every 8 hours  ondansetron Injectable 4 milliGRAM(s) IV Push every 8 hours  oxyCODONE    IR 5 milliGRAM(s) Oral every 4 hours PRN  oxyCODONE    IR 10 milliGRAM(s) Oral every 4 hours PRN  polyethylene glycol 3350 17 Gram(s) Oral daily  senna 2 Tablet(s) Oral at bedtime  sodium chloride 0.9%. 1000 milliLiter(s) IV Continuous <Continuous>        Exam:  General: patient seen laying supine in bed in NAD  Neuro: AAOx3, follows commands, opens eyes spontaneously, speech clear and fluent, face symmetric, strength 5/5 b/l upper extremities and lower extremities, sensation intact to light touch throughout  HEENT: PERRL  Neck: supple  Cardiac: RRR, S1S2  Pulmonary: chest rise symmetric  Abdomen: soft, nontender, nondistended  Ext: perfusing well  Skin: warm, dry  Wound: lumbar incision dressing c/d/i, HMV x 1          Assessment:  37 y/o female with sickle cell trait and psoriasis (on dupixent [id number]), presents with worsening back pain since age 18 and now has right greater than left leg pain, 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    Plan:  Neuro:  - neuro/vital checks q8  - pain control: ERAS  - monitor HMV output  - pending standing lumbar xrays when HMV removed  - TLSO brace at bedside    Cardiac:  - SBP goal     Pulmonary:  - on RA    GI:  - regular diet  - bowel regimen    Renal:  - NS at 75 until adequate PO intake  - +alegria    Heme:  - SCDs for DVT prophylaxis, hold chemoprophylaxis POD0    Endo:  - no issues    ID:  - postop ancef    Disposition: regional status, full code, pending PT eval    D/w Dr. Newell        Assessment:  Present when checked    []  GCS  E   V  M     Heart Failure: []Acute, [] acute on chronic , []chronic  Heart Failure:  [] Diastolic (HFpEF), [] Systolic (HFrEF), []Combined (HFpEF and HFrEF), [] RHF, [] Pulm HTN, [] Other    [] RICHARDSON, [] ATN, [] AIN, [] other  [] CKD1, [] CKD2, [] CKD 3, [] CKD 4, [] CKD 5, []ESRD    Encephalopathy: [] Metabolic, [] Hepatic, [] toxic, [] Neurological, [] Other    Abnormal Nurtitional Status: [] malnurtition (see nutrition note), [ ]underweight: BMI < 19, [] morbid obesity: BMI >40, [] Cachexia    [] Sepsis  [] hypovolemic shock,[] cardiogenic shock, [] hemorrhagic shock, [] neuogenic shock  [] Acute Respiratory Failure  []Cerebral edema, [] Brain compression/ herniation,   [] Functional quadriplegia  [] Acute blood loss anemia

## 2024-04-24 NOTE — PRE-ANESTHESIA EVALUATION ADULT - NSANTHOSAYNRD_GEN_A_CORE
No. CHEPE screening performed.  STOP BANG Legend: 0-2 = LOW Risk; 3-4 = INTERMEDIATE Risk; 5-8 = HIGH Risk

## 2024-04-24 NOTE — H&P ADULT - HISTORY OF PRESENT ILLNESS
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 In 2022 back pain started radiating down both legs to feet.     1/30/24 Pt complains of low back pain radiating down BL legs. Pt complains of pain, numbness and tingling in both legs down to toes. Pain started in left leg now is in both legs. Pt states she cannot sleep at night d/t pain and cannot lay flat on her back. Pt cannot sit or stand for too long. Pt denies any recent falls, bowel/bladder incontinence. Pt is ambulating today without assistive device.  MRI and Xray Reviewed. L5 congenital spondylolysis reviewed.    She had been scheduled for L5-S1 ALIF with Dr. Contreras and presents today to establish care.     Lower back pain is constant. Reports she stumbles often with ambulation, denies any falls. Denies weakness in legs. Does not take anything pain. Pain worse with ambulation, improves with rest.   "    39 y/o female with sickle cell trait and psoriasis (on dupixent monthly), presents for spine surgery today. 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 In 2022 back pain started radiating down both legs to feet.     1/30/24 Pt complains of low back pain radiating down BL legs. Pt complains of pain, numbness and tingling in both legs down to toes. Pain started in left leg now is in both legs. Pt states she cannot sleep at night d/t pain and cannot lay flat on her back. Pt cannot sit or stand for too long. Pt denies any recent falls, bowel/bladder incontinence. Pt is ambulating today without assistive device.  MRI and Xray Reviewed. L5 congenital spondylolysis reviewed.    She had been scheduled for L5-S1 ALIF with Dr. Contreras and presents today to establish care.     Lower back pain is constant. Reports she stumbles often with ambulation, denies any falls. Denies weakness in legs. Does not take anything pain. Pain worse with ambulation, improves with rest.   "    37 y/o female with sickle cell trait and psoriasis (on dupixent [id number]), presents for spine surgery today. She reports worsening back pain since age 18 and now has right greater than left leg pain.  She is taking no pain medication.  She denies bowel or bladder changes, saddle incontinence.  She has numbness in both feet and intermittently down the right leg.   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 In 2022 back pain started radiating down both legs to feet, much worse on right side. Patient has increased back and leg paresthesias with ambulation c/w neurogenic claudication.     1/30/24 Pt complains of low back pain radiating down BL legs. Pt complains of pain, numbness and tingling in both legs down to toes. Pain started in left leg now is in both legs. Pt states she cannot sleep at night d/t pain and cannot lay flat on her back. Pt cannot sit or stand for too long. Pt denies any recent falls, bowel/bladder incontinence. Pt is ambulating today without assistive device.  MRI and Xray Reviewed. L5 congenital spondylolysis reviewed.    Lower back pain is constant. Reports she stumbles often with ambulation, denies any falls. Denies weakness in legs. Does not take anything pain. Pain worse with ambulation, improves with rest.   "    39 y/o female with sickle cell trait and psoriasis (on dupixent [id number]), presents for spine surgery today. She reports worsening back pain since age 18 and now has right greater than left leg pain.  She is taking no pain medication.  She denies bowel or bladder changes, saddle incontinence.  She has numbness in both feet and intermittently down the right leg.

## 2024-04-25 LAB
ANION GAP SERPL CALC-SCNC: 9 MMOL/L — SIGNIFICANT CHANGE UP (ref 5–17)
BUN SERPL-MCNC: 6 MG/DL — LOW (ref 7–23)
CALCIUM SERPL-MCNC: 8.4 MG/DL — SIGNIFICANT CHANGE UP (ref 8.4–10.5)
CHLORIDE SERPL-SCNC: 104 MMOL/L — SIGNIFICANT CHANGE UP (ref 96–108)
CO2 SERPL-SCNC: 23 MMOL/L — SIGNIFICANT CHANGE UP (ref 22–31)
CREAT SERPL-MCNC: 0.62 MG/DL — SIGNIFICANT CHANGE UP (ref 0.5–1.3)
EGFR: 118 ML/MIN/1.73M2 — SIGNIFICANT CHANGE UP
GLUCOSE SERPL-MCNC: 115 MG/DL — HIGH (ref 70–99)
HCT VFR BLD CALC: 28.6 % — LOW (ref 34.5–45)
HGB BLD-MCNC: 9.8 G/DL — LOW (ref 11.5–15.5)
MAGNESIUM SERPL-MCNC: 2.3 MG/DL — SIGNIFICANT CHANGE UP (ref 1.6–2.6)
MCHC RBC-ENTMCNC: 27.3 PG — SIGNIFICANT CHANGE UP (ref 27–34)
MCHC RBC-ENTMCNC: 34.3 GM/DL — SIGNIFICANT CHANGE UP (ref 32–36)
MCV RBC AUTO: 79.7 FL — LOW (ref 80–100)
NRBC # BLD: 0 /100 WBCS — SIGNIFICANT CHANGE UP (ref 0–0)
PHOSPHATE SERPL-MCNC: 2.9 MG/DL — SIGNIFICANT CHANGE UP (ref 2.5–4.5)
PLATELET # BLD AUTO: 197 K/UL — SIGNIFICANT CHANGE UP (ref 150–400)
POTASSIUM SERPL-MCNC: 4.2 MMOL/L — SIGNIFICANT CHANGE UP (ref 3.5–5.3)
POTASSIUM SERPL-SCNC: 4.2 MMOL/L — SIGNIFICANT CHANGE UP (ref 3.5–5.3)
RBC # BLD: 3.59 M/UL — LOW (ref 3.8–5.2)
RBC # FLD: 14.9 % — HIGH (ref 10.3–14.5)
SODIUM SERPL-SCNC: 136 MMOL/L — SIGNIFICANT CHANGE UP (ref 135–145)
WBC # BLD: 12.72 K/UL — HIGH (ref 3.8–10.5)
WBC # FLD AUTO: 12.72 K/UL — HIGH (ref 3.8–10.5)

## 2024-04-25 PROCEDURE — 99222 1ST HOSP IP/OBS MODERATE 55: CPT

## 2024-04-25 PROCEDURE — 99232 SBSQ HOSP IP/OBS MODERATE 35: CPT

## 2024-04-25 RX ADMIN — METHOCARBAMOL 500 MILLIGRAM(S): 500 TABLET, FILM COATED ORAL at 05:34

## 2024-04-25 RX ADMIN — Medication 1000 MILLIGRAM(S): at 08:02

## 2024-04-25 RX ADMIN — Medication 1000 MILLIGRAM(S): at 14:08

## 2024-04-25 RX ADMIN — OXYCODONE HYDROCHLORIDE 10 MILLIGRAM(S): 5 TABLET ORAL at 15:28

## 2024-04-25 RX ADMIN — GABAPENTIN 300 MILLIGRAM(S): 400 CAPSULE ORAL at 21:21

## 2024-04-25 RX ADMIN — GABAPENTIN 300 MILLIGRAM(S): 400 CAPSULE ORAL at 14:08

## 2024-04-25 RX ADMIN — Medication 1000 MILLIGRAM(S): at 02:45

## 2024-04-25 RX ADMIN — METHOCARBAMOL 500 MILLIGRAM(S): 500 TABLET, FILM COATED ORAL at 14:09

## 2024-04-25 RX ADMIN — GABAPENTIN 300 MILLIGRAM(S): 400 CAPSULE ORAL at 05:34

## 2024-04-25 RX ADMIN — POLYETHYLENE GLYCOL 3350 17 GRAM(S): 17 POWDER, FOR SOLUTION ORAL at 11:34

## 2024-04-25 RX ADMIN — OXYCODONE HYDROCHLORIDE 10 MILLIGRAM(S): 5 TABLET ORAL at 16:28

## 2024-04-25 RX ADMIN — METHOCARBAMOL 500 MILLIGRAM(S): 500 TABLET, FILM COATED ORAL at 21:21

## 2024-04-25 RX ADMIN — OXYCODONE HYDROCHLORIDE 10 MILLIGRAM(S): 5 TABLET ORAL at 22:21

## 2024-04-25 RX ADMIN — Medication 2.5 MILLIGRAM(S): at 00:08

## 2024-04-25 RX ADMIN — SENNA PLUS 2 TABLET(S): 8.6 TABLET ORAL at 21:21

## 2024-04-25 RX ADMIN — Medication 1000 MILLIGRAM(S): at 19:05

## 2024-04-25 RX ADMIN — Medication 1000 MILLIGRAM(S): at 03:45

## 2024-04-25 RX ADMIN — Medication 1000 MILLIGRAM(S): at 07:02

## 2024-04-25 RX ADMIN — OXYCODONE HYDROCHLORIDE 10 MILLIGRAM(S): 5 TABLET ORAL at 21:21

## 2024-04-25 NOTE — PHYSICAL THERAPY INITIAL EVALUATION ADULT - GENERAL OBSERVATIONS, REHAB EVAL
Pt received semi supine in bed +hep lock +hemovac +SCD> RN Bailey aware of intent to treat. pt is POD #1 Fusion, spine, lumbar, TLIF, 1-2 levels  tolerated sesssion well educated on spinal precautions amb 200 ft with RW supervision. Pt was left as received with call bell in reach, VSS, and in NAD.

## 2024-04-25 NOTE — PHYSICAL THERAPY INITIAL EVALUATION ADULT - ADDITIONAL COMMENTS
Pt states with kids in 5th floor walk up. Pt was independent with ADLs and amb PTA denies use of AD.

## 2024-04-25 NOTE — CONSULT NOTE ADULT - SUBJECTIVE AND OBJECTIVE BOX
LM for patient reminding them to go for lab work before their appt on 1/19  Mailed scripts as patient typically goes to [facility]  Patient is a 38y old  Female who presents with a chief complaint of back pain and bilateral leg pain (2024 01:16)      HPI:  37 y/o female with sickle cell trait and psoriasis (on dupixent [id number]), presents for spine surgery today. She reports worsening back pain since age 18 and now has right greater than left leg pain.  She is taking no pain medication.  She denies bowel or bladder changes, saddle incontinence.  She has numbness in both feet and intermittently down the right leg.   (2024 06:41)    Patient seen and examined at bedside.  Feels well, back pain worst when lying flat but improves when lying on her side, worked well with PT.  Has been using incentive spirometer often.  With regards to home medications she was only on Dupixent for psoriasis but held dose week of surgery.  She plans to work with her dermatologist to switch to Bimekizumab as Dupixent more for atopic dermatitis.  Plans to see Derm post op after recovery.  Not on any other chronic medications    REVIEW OF SYSTEMS: see HPI    PAST MEDICAL & SURGICAL HISTORY:  Spondylolysis of lumbar region      History of sickle cell trait      Infectious gastroenteritis      Previous  section  x2    MEDICATIONS:  MEDICATIONS  (STANDING):  acetaminophen     Tablet .. 1000 milliGRAM(s) Oral every 6 hours  gabapentin 300 milliGRAM(s) Oral every 8 hours  methocarbamol 500 milliGRAM(s) Oral every 8 hours  ondansetron Injectable 4 milliGRAM(s) IV Push every 8 hours  polyethylene glycol 3350 17 Gram(s) Oral daily  senna 2 Tablet(s) Oral at bedtime    MEDICATIONS  (PRN):  oxyCODONE    IR 5 milliGRAM(s) Oral every 4 hours PRN Moderate Pain (4 - 6)  oxyCODONE    IR 10 milliGRAM(s) Oral every 4 hours PRN Severe Pain (7 - 10)      ALLERGIES:  Allergies    No Known Allergies    Intolerances        VITAL SIGNS:  Vital Signs Last 24 Hrs  T(C): 36.7 (2024 08:53), Max: 36.8 (2024 04:54)  T(F): 98 (2024 08:53), Max: 98.2 (2024 04:54)  HR: 65 (2024 08:53) (57 - 75)  BP: 105/65 (2024 08:53) (90/54 - 127/67)  BP(mean): 68 (2024 21:45) (66 - 92)  RR: 16 (2024 08:53) (11 - 18)  SpO2: 95% (2024 08:53) (95% - 100%)    Parameters below as of 2024 08:53  Patient On (Oxygen Delivery Method): room air        24 @ 07:01  -  24 @ 07:00  --------------------------------------------------------  IN:    Oral Fluid: 480 mL    sodium chloride 0.9%: 450 mL  Total IN: 930 mL    OUT:    Drain (mL): 230 mL    Indwelling Catheter - Urethral (mL): 440 mL    Voided (mL): 600 mL  Total OUT: 1270 mL    Total NET: -340 mL      24 @ 07:01  -  24 @ 15:54  --------------------------------------------------------  IN:  Total IN: 0 mL    OUT:    Drain (mL): 80 mL  Total OUT: 80 mL    Total NET: -80 mL          PHYSICAL EXAM:  Constitutional: NAD, comfortable in bed.  HEENT: NC/AT, PERRLA, EOMI, MMM  Neck: Supple, no JVD  Respiratory: CTA B/L. No w/r/r.   Cardiovascular: RRR, normal S1 and S2, no m/r/g.   Gastrointestinal: +BS, soft NTND, no guarding or rebound tenderness, no palpable masses   Extremities: wwp; no cyanosis, clubbing or edema.   Vascular: Pulses equal and strong throughout.   Neurological: AAOx3, no CN deficits, strength and sensation intact throughout.   Skin: Back dressing c/d/i, HMV with serosanguinous drainage        LABS:                        9.8    12.72 )-----------( 197      ( 2024 05:30 )             28.6     04-25    136  |  104  |  6<L>  ----------------------------<  115<H>  4.2   |  23  |  0.62    Ca    8.4      2024 05:30  Phos  2.9       Mg     2.3             Urinalysis Basic - ( 2024 05:30 )    Color: x / Appearance: x / SG: x / pH: x  Gluc: 115 mg/dL / Ketone: x  / Bili: x / Urobili: x   Blood: x / Protein: x / Nitrite: x   Leuk Esterase: x / RBC: x / WBC x   Sq Epi: x / Non Sq Epi: x / Bacteria: x          CAPILLARY BLOOD GLUCOSE              RADIOLOGY & ADDITIONAL TESTS: Reviewed.

## 2024-04-25 NOTE — PROGRESS NOTE ADULT - SUBJECTIVE AND OBJECTIVE BOX
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 In  back pain started radiating down both legs to feet, much worse on right side. Patient has increased back and leg paresthesias with ambulation c/w neurogenic claudication.     24 Pt complains of low back pain radiating down BL legs. Pt complains of pain, numbness and tingling in both legs down to toes. Pain started in left leg now is in both legs. Pt states she cannot sleep at night d/t pain and cannot lay flat on her back. Pt cannot sit or stand for too long. Pt denies any recent falls, bowel/bladder incontinence. Pt is ambulating today without assistive device.  MRI and Xray Reviewed. L5 congenital spondylolysis reviewed.    Lower back pain is constant. Reports she stumbles often with ambulation, denies any falls. Denies weakness in legs. Does not take anything pain. Pain worse with ambulation, improves with rest.   "    37 y/o female with sickle cell trait and psoriasis (on dupixent [id number]), presents for spine surgery today. She reports worsening back pain since age 18 and now has right greater than left leg pain.  She is taking no pain medication.  She denies bowel or bladder changes, saddle incontinence.  She has numbness in both feet and intermittently down the right leg.   (2024 06:41)    OVERNIGHT EVENTS: Seen and examined in 8WO. Denies HA, N/V, chest pain, shortness of breath, new weakness or numbness.     HOSPITAL COURSE:  : POD0 from L5-S1 TLIF. Reglan 10mg IVP x1 for nausea. Prochloperazine 2.5mg IVP for nausea.   : POD 1. MARTÍNEZ overnight, neuro stable.     Vital Signs Last 24 Hrs  T(C): 36.6 (2024 00:11), Max: 36.6 (2024 00:11)  T(F): 97.9 (2024 00:11), Max: 97.9 (2024 00:11)  HR: 62 (2024 00:13) (59 - 95)  BP: 107/67 (2024 00:13) (90/54 - 128/79)  BP(mean): 68 (2024 21:45) (66 - 96)  RR: 17 (2024 00:13) (11 - 26)  SpO2: 97% (2024 00:13) (96% - 100%)    Parameters below as of 2024 00:13  Patient On (Oxygen Delivery Method): room air        I&O's Summary    2024 07:01  -  2024 01:16  --------------------------------------------------------  IN: 930 mL / OUT: 390 mL / NET: 540 mL        PHYSICAL EXAM:  General: NAD, pt is comfortably sitting up in bed, on room air  HEENT: CN II-XII intact, PERRL 3mm briskly reactive, EOMI b/l, face symmetric, tongue midline, neck FROM  Cardiovascular: RRR, S1, S2  Respiratory: breathing non-labored on RA, chest rise symmetric  GI: abd soft, NTND   Neuro: A&Ox3, No aphasia, speech clear, no dysmetria, no pronator drift. Follows commands.  COTA x4 spontaneously, 5/5 strength in all extremities throughout. Sensation intact  Extremities: distal pulses 2+ x4  Wound/incision: lumbar incision with aquacel dressing c/d/i  Drain: HMV x1     TUBES/LINES:  [] Rowan  [x] Wound Drains  [] Others      DIET:  [] NPO  [x] Mechanical  [] Tube feeds    LABS:                        10.7   11.22 )-----------( 224      ( 2024 15:32 )             30.1     04-24    137  |  105  |  8   ----------------------------<  161<H>  4.4   |  21<L>  |  0.56    Ca    8.7      2024 15:32  Phos  3.0     04-24  Mg     1.8     04-24        Urinalysis Basic - ( 2024 15:32 )    Color: x / Appearance: x / SG: x / pH: x  Gluc: 161 mg/dL / Ketone: x  / Bili: x / Urobili: x   Blood: x / Protein: x / Nitrite: x   Leuk Esterase: x / RBC: x / WBC x   Sq Epi: x / Non Sq Epi: x / Bacteria: x          CAPILLARY BLOOD GLUCOSE          Drug Levels: [] N/A    CSF Analysis: [] N/A      Allergies    No Known Allergies    Intolerances      MEDICATIONS:  Antibiotics:    Neuro:  acetaminophen     Tablet .. 1000 milliGRAM(s) Oral every 6 hours  gabapentin 300 milliGRAM(s) Oral every 8 hours  methocarbamol 500 milliGRAM(s) Oral every 8 hours  ondansetron Injectable 4 milliGRAM(s) IV Push every 8 hours  oxyCODONE    IR 5 milliGRAM(s) Oral every 4 hours PRN  oxyCODONE    IR 10 milliGRAM(s) Oral every 4 hours PRN    Anticoagulation:    OTHER:  polyethylene glycol 3350 17 Gram(s) Oral daily  senna 2 Tablet(s) Oral at bedtime    IVF:  sodium chloride 0.9%. 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    M48.062    No pertinent family history in first degree relatives    Handoff    MEWS Score    Spondylolysis of lumbar region    History of sickle cell trait    Infectious gastroenteritis    Spondylolisthesis at L5-S1 level    Spondylolisthesis at L5-S1 level    Fusion, spine, lumbar, TLIF, 1-2 levels    Previous  section    [id number]        PLAN:  Neuro:  - neuro/vital checks q8  - pain control: ERAS  - monitor HMV output  - pending standing lumbar xrays when HMV removed  - TLSO brace when OOB    Cardiac:  - SBP goal     Pulmonary:  - on RA    GI:  - regular diet  - bowel regimen    Renal:  - NS at 75 until adequate PO intake   - pending TOV    Heme:  - SCDs for DVT prophylaxis, hold chemoprophylaxis POD0    Endo:  - no issues    ID:  - postop ancef    Disposition: regional status, full code, pending PT eval    D/w Dr. Newell    Assessment:  Present when checked    []  GCS  E   V  M     Heart Failure: []Acute, [] acute on chronic , []chronic  Heart Failure:  [] Diastolic (HFpEF), [] Systolic (HFrEF), []Combined (HFpEF and HFrEF), [] RHF, [] Pulm HTN, [] Other    [] RICHARDSON, [] ATN, [] AIN, [] other  [] CKD1, [] CKD2, [] CKD 3, [] CKD 4, [] CKD 5, []ESRD    Encephalopathy: [] Metabolic, [] Hepatic, [] toxic, [] Neurological, [] Other    Abnormal Nurtitional Status: [] malnurtition (see nutrition note), [ ]underweight: BMI < 19, [] morbid obesity: BMI >40, [] Cachexia    [] Sepsis  [] hypovolemic shock,[] cardiogenic shock, [] hemorrhagic shock, [] neuogenic shock  [] Acute Respiratory Failure  []Cerebral edema, [] Brain compression/ herniation,   [] Functional quadriplegia  [] Acute blood loss anemia

## 2024-04-25 NOTE — CONSULT NOTE ADULT - ASSESSMENT
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    #Spondylolysis  #Post op state  -  s/p L5-S1 TLIF (4/24)  - pain control per primary team, c/w bowel regimen  - PT/OT eval - No needs  - DVT ppx: SCDs  - encourage incentive spirometer, OOB as tolerated    #Psoriasis  - on Dupixent, held week of OR  - f/u with Derm post op for possible transition to Bimekizumab    #Leukocytosis  - WBC 11-12, likely reactive post op  - no infectious symptoms    #Acute blood loss anemia  - Hgb 9-10 post op from 11 preop outpatient  - likely component of surgical blood loss, HDS, no signs of active bleeding  - Transfuse Hgb < 7    #HLD  - outpatient labs with mildly elevated cholesterol  - No statin indicated at this time, low ASVCD risk  - PCP f/u, lifestyle modification    Dispo: home pending drain

## 2024-04-25 NOTE — PHYSICAL THERAPY INITIAL EVALUATION ADULT - PERTINENT HX OF CURRENT PROBLEM, REHAB EVAL
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

## 2024-04-26 ENCOUNTER — TRANSCRIPTION ENCOUNTER (OUTPATIENT)
Age: 39
End: 2024-04-26

## 2024-04-26 LAB
ANION GAP SERPL CALC-SCNC: 6 MMOL/L — SIGNIFICANT CHANGE UP (ref 5–17)
BUN SERPL-MCNC: 7 MG/DL — SIGNIFICANT CHANGE UP (ref 7–23)
CALCIUM SERPL-MCNC: 8.6 MG/DL — SIGNIFICANT CHANGE UP (ref 8.4–10.5)
CHLORIDE SERPL-SCNC: 102 MMOL/L — SIGNIFICANT CHANGE UP (ref 96–108)
CO2 SERPL-SCNC: 29 MMOL/L — SIGNIFICANT CHANGE UP (ref 22–31)
CREAT SERPL-MCNC: 0.64 MG/DL — SIGNIFICANT CHANGE UP (ref 0.5–1.3)
EGFR: 117 ML/MIN/1.73M2 — SIGNIFICANT CHANGE UP
GLUCOSE SERPL-MCNC: 95 MG/DL — SIGNIFICANT CHANGE UP (ref 70–99)
HCT VFR BLD CALC: 29.3 % — LOW (ref 34.5–45)
HGB BLD-MCNC: 10 G/DL — LOW (ref 11.5–15.5)
MAGNESIUM SERPL-MCNC: 2 MG/DL — SIGNIFICANT CHANGE UP (ref 1.6–2.6)
MCHC RBC-ENTMCNC: 27.1 PG — SIGNIFICANT CHANGE UP (ref 27–34)
MCHC RBC-ENTMCNC: 34.1 GM/DL — SIGNIFICANT CHANGE UP (ref 32–36)
MCV RBC AUTO: 79.4 FL — LOW (ref 80–100)
NRBC # BLD: 0 /100 WBCS — SIGNIFICANT CHANGE UP (ref 0–0)
PHOSPHATE SERPL-MCNC: 3 MG/DL — SIGNIFICANT CHANGE UP (ref 2.5–4.5)
PLATELET # BLD AUTO: 230 K/UL — SIGNIFICANT CHANGE UP (ref 150–400)
POTASSIUM SERPL-MCNC: 4 MMOL/L — SIGNIFICANT CHANGE UP (ref 3.5–5.3)
POTASSIUM SERPL-SCNC: 4 MMOL/L — SIGNIFICANT CHANGE UP (ref 3.5–5.3)
RBC # BLD: 3.69 M/UL — LOW (ref 3.8–5.2)
RBC # FLD: 14.8 % — HIGH (ref 10.3–14.5)
SODIUM SERPL-SCNC: 137 MMOL/L — SIGNIFICANT CHANGE UP (ref 135–145)
WBC # BLD: 11.02 K/UL — HIGH (ref 3.8–10.5)
WBC # FLD AUTO: 11.02 K/UL — HIGH (ref 3.8–10.5)

## 2024-04-26 PROCEDURE — 99024 POSTOP FOLLOW-UP VISIT: CPT

## 2024-04-26 PROCEDURE — 99232 SBSQ HOSP IP/OBS MODERATE 35: CPT

## 2024-04-26 RX ORDER — POLYETHYLENE GLYCOL 3350 17 G/17G
17 POWDER, FOR SOLUTION ORAL
Refills: 0 | Status: DISCONTINUED | OUTPATIENT
Start: 2024-04-26 | End: 2024-04-29

## 2024-04-26 RX ADMIN — OXYCODONE HYDROCHLORIDE 10 MILLIGRAM(S): 5 TABLET ORAL at 21:20

## 2024-04-26 RX ADMIN — SENNA PLUS 2 TABLET(S): 8.6 TABLET ORAL at 21:20

## 2024-04-26 RX ADMIN — Medication 1000 MILLIGRAM(S): at 08:03

## 2024-04-26 RX ADMIN — OXYCODONE HYDROCHLORIDE 10 MILLIGRAM(S): 5 TABLET ORAL at 16:59

## 2024-04-26 RX ADMIN — Medication 1000 MILLIGRAM(S): at 01:10

## 2024-04-26 RX ADMIN — Medication 1000 MILLIGRAM(S): at 13:59

## 2024-04-26 RX ADMIN — GABAPENTIN 300 MILLIGRAM(S): 400 CAPSULE ORAL at 21:20

## 2024-04-26 RX ADMIN — GABAPENTIN 300 MILLIGRAM(S): 400 CAPSULE ORAL at 14:00

## 2024-04-26 RX ADMIN — METHOCARBAMOL 500 MILLIGRAM(S): 500 TABLET, FILM COATED ORAL at 05:35

## 2024-04-26 RX ADMIN — OXYCODONE HYDROCHLORIDE 10 MILLIGRAM(S): 5 TABLET ORAL at 17:59

## 2024-04-26 RX ADMIN — POLYETHYLENE GLYCOL 3350 17 GRAM(S): 17 POWDER, FOR SOLUTION ORAL at 17:00

## 2024-04-26 RX ADMIN — METHOCARBAMOL 500 MILLIGRAM(S): 500 TABLET, FILM COATED ORAL at 21:20

## 2024-04-26 RX ADMIN — OXYCODONE HYDROCHLORIDE 10 MILLIGRAM(S): 5 TABLET ORAL at 06:36

## 2024-04-26 RX ADMIN — POLYETHYLENE GLYCOL 3350 17 GRAM(S): 17 POWDER, FOR SOLUTION ORAL at 10:54

## 2024-04-26 RX ADMIN — METHOCARBAMOL 500 MILLIGRAM(S): 500 TABLET, FILM COATED ORAL at 14:00

## 2024-04-26 RX ADMIN — OXYCODONE HYDROCHLORIDE 10 MILLIGRAM(S): 5 TABLET ORAL at 22:20

## 2024-04-26 RX ADMIN — GABAPENTIN 300 MILLIGRAM(S): 400 CAPSULE ORAL at 05:35

## 2024-04-26 RX ADMIN — OXYCODONE HYDROCHLORIDE 10 MILLIGRAM(S): 5 TABLET ORAL at 05:36

## 2024-04-26 NOTE — DISCHARGE NOTE PROVIDER - NSDCFUADDINST_GEN_ALL_CORE_FT
Neurosurgery follow up appointment date/time:  - are staples/sutures in place?  - what day should staples/sutures be removed (POD 10-14)?  - please call the office to confirm appointment:     Wound Care:  - can patient shower?  - does dressing need to be changed/removed?  - no picking at incision  - wears glasses?   - pressure ulcer?     Devices:  - does patient need collar or brace or helmet?   - does collar/brace need to be worn at all times or just when OOB?  - RW or cane for ambulation?    Drains/Lines:  - PICC in place? ID follow up? (Paper Rx for: antibiotics, heparin flush, weekly lab draws)  - BRAD in place? Management?  - alegria in place? Management/Urology follow up?  - Tracheostomy?  - PEG tube?      Activity:  - fatigue is common after surgery, rest if you feel tired   - no bending, lifting, twisting or heavy lifting   - walking is recommended, ambulate as tolerated  - you may shower when you get home, keep your incision dry  - no soaking in a tub/pool/hot tub   - no driving within 24 hours of anesthesia or while taking prescription pain medications   - keep hydrated, drink plenty of water   - skullbase precautions: no nose blowing, sneeze with mouth open, no drinking out of a straw, no straining      Inpatient consults:  - final recommendations  - you will need follow up with....    Please also follow up with your primary care doctor.     Pain Expectations:  - pain after surgery is expected  - please take pain meds as prescribed     Medications:  -Continue home medications as prescribed:   -HOLD home Dupixent until follow up with     -Pain Medications:   -Tylenol 1,000mg every 8 hours as needed for mild pain. Do not exceed maximum daily dose 4,000mg  -Oxycodone 5mg every 8 hours as needed for severe pain (can cause sleepiness, constipation)  -Gabapentin 300mg every 8 hours for nerve pain (can cause sleepiness, irritability)   -Robaxin 500mg every 8 hours as needed for muscle spasm (can cause sleepiness, irritability)   -pain medications can cause constipation, you should eat a high fiber diet and may take a stool softener while on pain meds   - Avoid taking Advil (ibuprofen), Motrin (naproxen), or Aspirin for pain as they can cause bleeding     Call the office or come to ED if:  - wound has drainage or bleeding, increased redness or pain at incision site, neurological change, fever (>101), chills, night sweats, syncope, nausea/vomiting, chest pain, shortness of breath      Playback:  - Your discharge instructions are on [facility] for your convenience.     WITHIN 24 HOURS OF DISCHARGE, PLEASE CONTACT NEURO PA  WITH ANY QUESTIONS OR CONCERNS: [phone]   OTHERWISE, PLEASE CALL THE OFFICE WITH ANY QUESTIONS OR CONCERNS: [phone]. Neurosurgery follow up appointment date/time:  - You have staples in place at your incision site. You also have steri strips at your drain exit sites, they will fall off on their own   -Staples will be removed at your follow up appointment  - please call the office to confirm appointment: [phone]    Wound Care:  - You may shower daily starting today   -Use gentle baby shampoo to clean incision, use clean towel to pat incision dry   -Leave incision open to air   -no creams or ointments   - no picking at incision  -no hot tubs or saunas     Devices:  - TLSO brace when out of bed     Activity:  - fatigue is common after surgery, rest if you feel tired   - no bending, lifting, twisting or heavy lifting   - walking is recommended, ambulate as tolerated  - you may shower when you get home, keep your incision dry  - no soaking in a tub/pool/hot tub   - no driving within 24 hours of anesthesia or while taking prescription pain medications   - keep hydrated, drink plenty of water     Please also follow up with your primary care doctor.     Pain Expectations:  - pain after surgery is expected  - please take pain meds as prescribed     Medications:  -HOLD home Dupixent until follow up with your Dermatologist     -Pain Medications:   -Tylenol 1,000mg every 8 hours as needed for mild pain. Do not exceed maximum daily dose 4,000mg  -Oxycodone 5mg every 8 hours as needed for severe pain (can cause sleepiness, constipation. Take with stool softner)  -Gabapentin 300mg every 8 hours for nerve pain (can cause sleepiness, irritability)   -Robaxin 500mg every 8 hours as needed for muscle spasm (can cause sleepiness, irritability)   -pain medications can cause constipation, you should eat a high fiber diet and may take a stool softener while on pain meds   - Avoid taking Advil (ibuprofen), Motrin (naproxen), or Aspirin for pain as they can cause bleeding     Call the office or come to ED if:  - wound has drainage or bleeding, increased redness or pain at incision site, neurological change, fever (>101), chills, night sweats, syncope, nausea/vomiting, chest pain, shortness of breath      Playback:  - Your discharge instructions are on [facility] for your convenience.     WITHIN 24 HOURS OF DISCHARGE, PLEASE CONTACT NEURO PA  WITH ANY QUESTIONS OR CONCERNS: [phone]   OTHERWISE, PLEASE CALL THE OFFICE WITH ANY QUESTIONS OR CONCERNS: [phone]. Neurosurgery follow up appointment date/time:  - You have staples in place at your incision site. You also have steri strips at your drain exit site, they will fall off on their own   -Staples will be removed at your follow up appointment  - please call the office to confirm appointment: [phone]    Wound Care:  - You may shower daily starting today   -Use gentle baby shampoo to clean incision, use clean towel to pat incision dry   -Leave incision open to air   -no creams or ointments   - no picking at incision  -no hot tubs or saunas     Devices:  - TLSO brace when out of bed     Activity:  - fatigue is common after surgery, rest if you feel tired   - no bending, lifting, twisting or heavy lifting   - walking is recommended, ambulate as tolerated  - you may shower when you get home, keep your incision dry  - no soaking in a tub/pool/hot tub   - no driving within 24 hours of anesthesia or while taking prescription pain medications   - keep hydrated, drink plenty of water     Please also follow up with your primary care doctor.     Pain Expectations:  - pain after surgery is expected  - please take pain meds as prescribed     Medications:  -HOLD home Dupixent until follow up with your Dermatologist     -Pain Medications:   -Tylenol 1,000mg every 8 hours as needed for mild pain. Do not exceed maximum daily dose 4,000mg  -Oxycodone 5mg every 8 hours as needed for severe pain (can cause sleepiness, constipation. Take with stool softner)  -Gabapentin 300mg every 8 hours for nerve pain (can cause sleepiness, irritability)   -Robaxin 500mg every 8 hours as needed for muscle spasm (can cause sleepiness, irritability)   -pain medications can cause constipation, you should eat a high fiber diet and may take a stool softener while on pain meds   - Avoid taking Advil (ibuprofen), Motrin (naproxen), or Aspirin for pain as they can cause bleeding     Call the office or come to ED if:  - wound has drainage or bleeding, increased redness or pain at incision site, neurological change, fever (>101), chills, night sweats, syncope, nausea/vomiting, chest pain, shortness of breath      Playback:  - Your discharge instructions are on [facility] for your convenience.     WITHIN 24 HOURS OF DISCHARGE, PLEASE CONTACT NEURO PA  WITH ANY QUESTIONS OR CONCERNS: [phone]   OTHERWISE, PLEASE CALL THE OFFICE WITH ANY QUESTIONS OR CONCERNS: [phone].

## 2024-04-26 NOTE — PROGRESS NOTE ADULT - ASSESSMENT
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    #Spondylolysis  #Post op state  - s/p L5-S1 TLIF (4/24)  - pain control per primary team, c/w bowel regimen.  Would increase Miralax to BID  - PT/OT eval - No needs  - DVT ppx: SCDs  - encourage incentive spirometer, OOB as tolerated    #Constipation  - would increase miralax to BID, monitor for BM  - encourage mobilization    #Psoriasis  - on Dupixent, held week of OR  - f/u with Derm post op for possible transition to Bimekizumab    #Leukocytosis  - WBC 11-12, likely reactive post op  - no infectious symptoms    #Acute blood loss anemia  - Hgb 9-10 post op from 11 preop outpatient  - likely component of surgical blood loss, HDS, no signs of active bleeding  - Transfuse Hgb < 7    #HLD  - outpatient labs with mildly elevated cholesterol  - No statin indicated at this time, low ASVCD risk  - PCP f/u, lifestyle modification    Dispo: home pending drain output

## 2024-04-26 NOTE — DISCHARGE NOTE PROVIDER - NSDCCPCAREPLAN_GEN_ALL_CORE_FT
PRINCIPAL DISCHARGE DIAGNOSIS  Diagnosis: Spondylolisthesis at L5-S1 level  Assessment and Plan of Treatment: S/p L5-S1 TLIF ()      SECONDARY DISCHARGE DIAGNOSES  Diagnosis: History of sickle cell trait  Assessment and Plan of Treatment:     Diagnosis: Previous  section  Assessment and Plan of Treatment:

## 2024-04-26 NOTE — PROGRESS NOTE ADULT - SUBJECTIVE AND OBJECTIVE BOX
Patient is a 38y old  Female who presents with a chief complaint of back pain and bilateral leg pain (26 Apr 2024 00:09)      SUBJECTIVE:  Patient seen and examined at bedside.  Did not sleep well, reports pain worst when lying flat.  Has not yet had a BM but is passing gas.    ROS:  Denies fevers, chills, headache, vision changes, neck pain, cough, SOB, chest pain, Abdominal pain, N/V, dysuria or new rash.  All other ROS negative except as above    Vital Signs Last 12 Hrs  T(F): 97.8 (04-26-24 @ 08:19), Max: 97.9 (04-26-24 @ 05:08)  HR: 77 (04-26-24 @ 08:19) (77 - 84)  BP: 102/56 (04-26-24 @ 08:19) (102/56 - 110/71)  BP(mean): 71 (04-26-24 @ 08:19) (71 - 71)  RR: 16 (04-26-24 @ 08:19) (16 - 16)  SpO2: 98% (04-26-24 @ 08:19) (98% - 99%)  I&O's Summary    25 Apr 2024 07:01  -  26 Apr 2024 07:00  --------------------------------------------------------  IN: 0 mL / OUT: 220 mL / NET: -220 mL        PHYSICAL EXAM:  Constitutional: NAD, comfortable in bed.  HEENT: NC/AT, PERRLA, EOMI, MMM  Neck: Supple, no JVD  Respiratory: CTA B/L. No w/r/r.   Cardiovascular: RRR, normal S1 and S2, no m/r/g.   Gastrointestinal: +BS mildly hypoactive, soft NTND   Extremities: wwp; no cyanosis, clubbing or edema.   Vascular: Pulses equal and strong throughout.   Neurological: AAOx3, no CN deficits, strength and sensation intact throughout.   Skin: Back dressing c/d/i, HMV with serosanguinous drainage        LABS:                        10.0   11.02 )-----------( 230      ( 26 Apr 2024 05:30 )             29.3     04-26    137  |  102  |  7   ----------------------------<  95  4.0   |  29  |  0.64    Ca    8.6      26 Apr 2024 05:30  Phos  3.0     04-26  Mg     2.0     04-26        Urinalysis Basic - ( 26 Apr 2024 05:30 )    Color: x / Appearance: x / SG: x / pH: x  Gluc: 95 mg/dL / Ketone: x  / Bili: x / Urobili: x   Blood: x / Protein: x / Nitrite: x   Leuk Esterase: x / RBC: x / WBC x   Sq Epi: x / Non Sq Epi: x / Bacteria: x          RADIOLOGY & ADDITIONAL TESTS:  No new imaging    MEDICATIONS  (STANDING):  acetaminophen     Tablet .. 1000 milliGRAM(s) Oral every 6 hours  gabapentin 300 milliGRAM(s) Oral every 8 hours  methocarbamol 500 milliGRAM(s) Oral every 8 hours  polyethylene glycol 3350 17 Gram(s) Oral two times a day  senna 2 Tablet(s) Oral at bedtime    MEDICATIONS  (PRN):  oxyCODONE    IR 5 milliGRAM(s) Oral every 4 hours PRN Moderate Pain (4 - 6)  oxyCODONE    IR 10 milliGRAM(s) Oral every 4 hours PRN Severe Pain (7 - 10)

## 2024-04-26 NOTE — DISCHARGE NOTE PROVIDER - NSDCFUADDAPPT_GEN_ALL_CORE_FT
Please follow up w/ Dr. Newell in the outpatient office. Call [phone] to confirm appointment date and time.     Please also follow up with your Primary Care Doctor.  Please follow up w/ Dr. Newell in the outpatient office in 2 weeks. Call [phone] to confirm appointment date and time.     Please also follow up with your Primary Care Doctor.

## 2024-04-26 NOTE — PROGRESS NOTE ADULT - SUBJECTIVE AND OBJECTIVE BOX
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 In  back pain started radiating down both legs to feet, much worse on right side. Patient has increased back and leg paresthesias with ambulation c/w neurogenic claudication.     24 Pt complains of low back pain radiating down BL legs. Pt complains of pain, numbness and tingling in both legs down to toes. Pain started in left leg now is in both legs. Pt states she cannot sleep at night d/t pain and cannot lay flat on her back. Pt cannot sit or stand for too long. Pt denies any recent falls, bowel/bladder incontinence. Pt is ambulating today without assistive device.  MRI and Xray Reviewed. L5 congenital spondylolysis reviewed.    Lower back pain is constant. Reports she stumbles often with ambulation, denies any falls. Denies weakness in legs. Does not take anything pain. Pain worse with ambulation, improves with rest.   "    39 y/o female with sickle cell trait and psoriasis (on dupixent [id number]), presents for spine surgery today. She reports worsening back pain since age 18 and now has right greater than left leg pain.  She is taking no pain medication.  She denies bowel or bladder changes, saddle incontinence.  She has numbness in both feet and intermittently down the right leg.   (2024 06:41)    OVERNIGHT EVENTS: NAD. Resting comfortably in bed. Reporting mild discomfort.     Hospital Course:   Vital Signs Last 24 Hrs  T(C): 36.7 (2024 20:18), Max: 36.8 (2024 04:54)  T(F): 98 (2024 20:18), Max: 98.2 (2024 04:54)  HR: 75 (2024 20:18) (57 - 88)  BP: 103/62 (2024 20:18) (100/65 - 114/74)  BP(mean): --  RR: 17 (2024 20:18) (16 - 18)  SpO2: 100% (2024 20:18) (95% - 100%)    Parameters below as of 2024 20:18  Patient On (Oxygen Delivery Method): room air        I&O's Summary    2024 07:01  -  2024 07:00  --------------------------------------------------------  IN: 930 mL / OUT: 1270 mL / NET: -340 mL    2024 07:01  -  2024 00:09  --------------------------------------------------------  IN: 0 mL / OUT: 80 mL / NET: -80 mL        PHYSICAL EXAM:  Constitutional: NAD, resting comfortably in bed.   HEENT: Pupils equal, round, reactive to light. EOMI. Face symmetric, tongue midline.  Respiratory: No respiratory distress, lungs clear to auscultation bilaterally.   Cardiovascular: RRR, S1, S2.   Gastrointestinal: Abdomen soft, non tender, nondistended.  Neurological:  AAOX3. Opens eyes. Follows commands. Speech clear.  Cranial Nerves: II-XII intact  Motor: 5/5 power in b/l UE and LE  Sensation: intact to light touch in all extremities  Pronator Drift: none  Dysmetria: none  Extremities: Warm, well perfused.  Wounds: L5-S1 TLIF surgical incision, c/d/i      TUBES/LINES:  [] Rowan  [] Lumbar Drain  [x] Wound Drains - HMV  [] Others      DIET:  [] NPO  [x] Mechanical  [] Tube feeds    LABS:                        9.8    12.72 )-----------( 197      ( 2024 05:30 )             28.6     04-25    136  |  104  |  6<L>  ----------------------------<  115<H>  4.2   |  23  |  0.62    Ca    8.4      2024 05:30  Phos  2.9     04-25  Mg     2.3     04-25        Urinalysis Basic - ( 2024 05:30 )    Color: x / Appearance: x / SG: x / pH: x  Gluc: 115 mg/dL / Ketone: x  / Bili: x / Urobili: x   Blood: x / Protein: x / Nitrite: x   Leuk Esterase: x / RBC: x / WBC x   Sq Epi: x / Non Sq Epi: x / Bacteria: x          CAPILLARY BLOOD GLUCOSE          Drug Levels: [] N/A    CSF Analysis: [] N/A      Allergies    No Known Allergies    Intolerances      MEDICATIONS:  Antibiotics:    Neuro:  acetaminophen     Tablet .. 1000 milliGRAM(s) Oral every 6 hours  gabapentin 300 milliGRAM(s) Oral every 8 hours  methocarbamol 500 milliGRAM(s) Oral every 8 hours  ondansetron Injectable 4 milliGRAM(s) IV Push every 8 hours  oxyCODONE    IR 5 milliGRAM(s) Oral every 4 hours PRN  oxyCODONE    IR 10 milliGRAM(s) Oral every 4 hours PRN    Anticoagulation:    OTHER:  polyethylene glycol 3350 17 Gram(s) Oral daily  senna 2 Tablet(s) Oral at bedtime    IVF:    CULTURES:    RADIOLOGY & ADDITIONAL TESTS:      ASSESSMENT:  39 y/o female with sickle cell trait and psoriasis (on dupixent [id number]), presents with worsening back pain since age 18 and now has right greater than left leg pain, 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  M48.062    No pertinent family history in first degree relatives    Handoff    MEWS Score    Spondylolysis of lumbar region    History of sickle cell trait    Infectious gastroenteritis    Spondylolisthesis at L5-S1 level    Spondylolisthesis at L5-S1 level    Fusion, spine, lumbar, TLIF, 1-2 levels    Previous  section    SysAdmin_VstLnk        PLAN:  Neuro:  - neuro/vital checks q8  - pain control: ERAS  - monitor HMV output  - pending standing lumbar xrays when HMV removed  - TLSO brace when OOB    Cardiac:  - SBP goal     Pulmonary:  - on RA    GI:  - regular diet  - bowel regimen    Renal:  - IVL  - voiding    Heme:  - SCDs for DVT prophylaxis, hold chemoprophylaxis POD0    Endo:  - no issues    ID:  - postop ancef    Disposition: regional status, full code, pending PT eval    D/w Dr. 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 In  back pain started radiating down both legs to feet, much worse on right side. Patient has increased back and leg paresthesias with ambulation c/w neurogenic claudication.     24 Pt complains of low back pain radiating down BL legs. Pt complains of pain, numbness and tingling in both legs down to toes. Pain started in left leg now is in both legs. Pt states she cannot sleep at night d/t pain and cannot lay flat on her back. Pt cannot sit or stand for too long. Pt denies any recent falls, bowel/bladder incontinence. Pt is ambulating today without assistive device.  MRI and Xray Reviewed. L5 congenital spondylolysis reviewed.    Lower back pain is constant. Reports she stumbles often with ambulation, denies any falls. Denies weakness in legs. Does not take anything pain. Pain worse with ambulation, improves with rest.   "    37 y/o female with sickle cell trait and psoriasis (on dupixent [id number]), presents for spine surgery today. She reports worsening back pain since age 18 and now has right greater than left leg pain.  She is taking no pain medication.  She denies bowel or bladder changes, saddle incontinence.  She has numbness in both feet and intermittently down the right leg.   (2024 06:41)    OVERNIGHT EVENTS: NAD. Resting comfortably in bed. Reporting mild discomfort.     Hospital Course:   : POD0 from L5-S1 TLIF. Reglan 10mg IVP x1 for nausea. Prochloperazine 2.5mg IVP for nausea. Rowan removed.   : POD 1. MARTÍNEZ overnight, neuro stable.   : POD 2, MARTÍNEZ o/n.     Vital Signs Last 24 Hrs  T(C): 36.7 (2024 20:18), Max: 36.8 (2024 04:54)  T(F): 98 (2024 20:18), Max: 98.2 (2024 04:54)  HR: 75 (2024 20:18) (57 - 88)  BP: 103/62 (2024 20:18) (100/65 - 114/74)  BP(mean): --  RR: 17 (2024 20:18) (16 - 18)  SpO2: 100% (2024 20:18) (95% - 100%)    Parameters below as of 2024 20:18  Patient On (Oxygen Delivery Method): room air        I&O's Summary    2024 07:01  -  2024 07:00  --------------------------------------------------------  IN: 930 mL / OUT: 1270 mL / NET: -340 mL    2024 07:01  -  2024 00:09  --------------------------------------------------------  IN: 0 mL / OUT: 80 mL / NET: -80 mL        PHYSICAL EXAM:  Constitutional: NAD, resting comfortably in bed.   HEENT: Pupils equal, round, reactive to light. EOMI. Face symmetric, tongue midline.  Respiratory: No respiratory distress, lungs clear to auscultation bilaterally.   Cardiovascular: RRR, S1, S2.   Gastrointestinal: Abdomen soft, non tender, nondistended.  Neurological:  AAOX3. Opens eyes. Follows commands. Speech clear.  Cranial Nerves: II-XII intact  Motor: 5/5 power in b/l UE and LE  Sensation: intact to light touch in all extremities  Pronator Drift: none  Dysmetria: none  Extremities: Warm, well perfused.  Wounds: L5-S1 TLIF surgical incision, c/d/i      TUBES/LINES:  [] Rowan  [] Lumbar Drain  [x] Wound Drains - HMV  [] Others      DIET:  [] NPO  [x] Mechanical  [] Tube feeds    LABS:                        9.8    12.72 )-----------( 197      ( 2024 05:30 )             28.6     04-25    136  |  104  |  6<L>  ----------------------------<  115<H>  4.2   |  23  |  0.62    Ca    8.4      2024 05:30  Phos  2.9     04-25  Mg     2.3     04-25        Urinalysis Basic - ( 2024 05:30 )    Color: x / Appearance: x / SG: x / pH: x  Gluc: 115 mg/dL / Ketone: x  / Bili: x / Urobili: x   Blood: x / Protein: x / Nitrite: x   Leuk Esterase: x / RBC: x / WBC x   Sq Epi: x / Non Sq Epi: x / Bacteria: x          CAPILLARY BLOOD GLUCOSE          Drug Levels: [] N/A    CSF Analysis: [] N/A      Allergies    No Known Allergies    Intolerances      MEDICATIONS:  Antibiotics:    Neuro:  acetaminophen     Tablet .. 1000 milliGRAM(s) Oral every 6 hours  gabapentin 300 milliGRAM(s) Oral every 8 hours  methocarbamol 500 milliGRAM(s) Oral every 8 hours  ondansetron Injectable 4 milliGRAM(s) IV Push every 8 hours  oxyCODONE    IR 5 milliGRAM(s) Oral every 4 hours PRN  oxyCODONE    IR 10 milliGRAM(s) Oral every 4 hours PRN    Anticoagulation:    OTHER:  polyethylene glycol 3350 17 Gram(s) Oral daily  senna 2 Tablet(s) Oral at bedtime    IVF:    CULTURES:    RADIOLOGY & ADDITIONAL TESTS:      ASSESSMENT:  37 y/o female with sickle cell trait and psoriasis (on dupixent [id number]), presents with worsening back pain since age 18 and now has right greater than left leg pain, 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  M48.062    No pertinent family history in first degree relatives    Handoff    MEWS Score    Spondylolysis of lumbar region    History of sickle cell trait    Infectious gastroenteritis    Spondylolisthesis at L5-S1 level    Spondylolisthesis at L5-S1 level    Fusion, spine, lumbar, TLIF, 1-2 levels    Previous  section    SysAdmin_VstLnk        PLAN:  Neuro:  - neuro/vital checks q8  - pain control: ERAS  - monitor HMV output  - pending standing lumbar xrays when HMV removed  - TLSO brace when OOB    Cardiac:  - SBP goal     Pulmonary:  - on RA    GI:  - regular diet  - bowel regimen    Renal:  - IVL  - voiding    Heme:  - SCDs for DVT prophylaxis, hold chemoprophylaxis POD0    Endo:  - no issues    ID:  - postop ancef    Disposition: regional status, full code, pending PT eval    D/w Dr. 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 In  back pain started radiating down both legs to feet, much worse on right side. Patient has increased back and leg paresthesias with ambulation c/w neurogenic claudication.     24 Pt complains of low back pain radiating down BL legs. Pt complains of pain, numbness and tingling in both legs down to toes. Pain started in left leg now is in both legs. Pt states she cannot sleep at night d/t pain and cannot lay flat on her back. Pt cannot sit or stand for too long. Pt denies any recent falls, bowel/bladder incontinence. Pt is ambulating today without assistive device.  MRI and Xray Reviewed. L5 congenital spondylolysis reviewed.    Lower back pain is constant. Reports she stumbles often with ambulation, denies any falls. Denies weakness in legs. Does not take anything pain. Pain worse with ambulation, improves with rest.   "    39 y/o female with sickle cell trait and psoriasis (on dupixent [id number]), presents for spine surgery today. She reports worsening back pain since age 18 and now has right greater than left leg pain.  She is taking no pain medication.  She denies bowel or bladder changes, saddle incontinence.  She has numbness in both feet and intermittently down the right leg.   (2024 06:41)    OVERNIGHT EVENTS: NAD. Resting comfortably in bed. Reporting mild discomfort.     Hospital Course:   : POD0 from L5-S1 TLIF. Reglan 10mg IVP x1 for nausea. Prochloperazine 2.5mg IVP for nausea. Rowan removed.   : POD 1. MARTÍNEZ overnight, neuro stable.      Vital Signs Last 24 Hrs  T(C): 36.7 (2024 20:18), Max: 36.8 (2024 04:54)  T(F): 98 (2024 20:18), Max: 98.2 (2024 04:54)  HR: 75 (2024 20:18) (57 - 88)  BP: 103/62 (2024 20:18) (100/65 - 114/74)  BP(mean): --  RR: 17 (2024 20:18) (16 - 18)  SpO2: 100% (2024 20:18) (95% - 100%)    Parameters below as of 2024 20:18  Patient On (Oxygen Delivery Method): room air        I&O's Summary    2024 07:01  -  2024 07:00  --------------------------------------------------------  IN: 930 mL / OUT: 1270 mL / NET: -340 mL    2024 07:01  -  2024 00:09  --------------------------------------------------------  IN: 0 mL / OUT: 80 mL / NET: -80 mL        PHYSICAL EXAM:  Constitutional: NAD, resting comfortably in bed.   HEENT: Pupils equal, round, reactive to light. EOMI. Face symmetric, tongue midline.  Respiratory: No respiratory distress, lungs clear to auscultation bilaterally.   Cardiovascular: RRR, S1, S2.   Gastrointestinal: Abdomen soft, non tender, nondistended.  Neurological:  AAOX3. Opens eyes. Follows commands. Speech clear.  Cranial Nerves: II-XII intact  Motor: 5/5 power in b/l UE and LE  Sensation: intact to light touch in all extremities  Pronator Drift: none  Dysmetria: none  Extremities: Warm, well perfused.  Wounds: L5-S1 TLIF surgical incision, c/d/i      TUBES/LINES:  [] Rowan  [] Lumbar Drain  [x] Wound Drains - HMV  [] Others      DIET:  [] NPO  [x] Mechanical  [] Tube feeds    LABS:                        9.8    12.72 )-----------( 197      ( 2024 05:30 )             28.6     04-25    136  |  104  |  6<L>  ----------------------------<  115<H>  4.2   |  23  |  0.62    Ca    8.4      2024 05:30  Phos  2.9     04-25  Mg     2.3     04-25        Urinalysis Basic - ( 2024 05:30 )    Color: x / Appearance: x / SG: x / pH: x  Gluc: 115 mg/dL / Ketone: x  / Bili: x / Urobili: x   Blood: x / Protein: x / Nitrite: x   Leuk Esterase: x / RBC: x / WBC x   Sq Epi: x / Non Sq Epi: x / Bacteria: x          CAPILLARY BLOOD GLUCOSE          Drug Levels: [] N/A    CSF Analysis: [] N/A      Allergies    No Known Allergies    Intolerances      MEDICATIONS:  Antibiotics:    Neuro:  acetaminophen     Tablet .. 1000 milliGRAM(s) Oral every 6 hours  gabapentin 300 milliGRAM(s) Oral every 8 hours  methocarbamol 500 milliGRAM(s) Oral every 8 hours  ondansetron Injectable 4 milliGRAM(s) IV Push every 8 hours  oxyCODONE    IR 5 milliGRAM(s) Oral every 4 hours PRN  oxyCODONE    IR 10 milliGRAM(s) Oral every 4 hours PRN    Anticoagulation:    OTHER:  polyethylene glycol 3350 17 Gram(s) Oral daily  senna 2 Tablet(s) Oral at bedtime    IVF:    CULTURES:    RADIOLOGY & ADDITIONAL TESTS:      ASSESSMENT:  39 y/o female with sickle cell trait and psoriasis (on dupixent [id number]), presents with worsening back pain since age 18 and now has right greater than left leg pain, 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  M48.062    No pertinent family history in first degree relatives    Handoff    MEWS Score    Spondylolysis of lumbar region    History of sickle cell trait    Infectious gastroenteritis    Spondylolisthesis at L5-S1 level    Spondylolisthesis at L5-S1 level    Fusion, spine, lumbar, TLIF, 1-2 levels    Previous  section    SysAdmin_VstLnk        PLAN:  Neuro:  - neuro/vital checks q8  - pain control: ERAS  - monitor HMV output  - pending standing lumbar xrays when HMV removed  - TLSO brace when OOB    Cardiac:  - SBP goal     Pulmonary:  - on RA    GI:  - regular diet  - bowel regimen    Renal:  - IVL  - voiding    Heme:  - SCDs for DVT prophylaxis, hold chemoprophylaxis POD0    Endo:  - no issues    ID:  - postop ancef    Disposition: regional status, full code, pending PT eval    D/w Dr. Newell

## 2024-04-26 NOTE — DISCHARGE NOTE PROVIDER - NSDCCPTREATMENT_GEN_ALL_CORE_FT
PRINCIPAL PROCEDURE  Procedure: Fusion, spine, lumbar, TLIF, 1-2 levels  Findings and Treatment: S/p L5-S1 TLIF (4/24)

## 2024-04-26 NOTE — DISCHARGE NOTE PROVIDER - NSDCMRMEDTOKEN_GEN_ALL_CORE_FT
gabapentin 300 mg oral capsule: 1 cap(s) orally every 8 hours MDD: 3 tabs  methocarbamol 500 mg oral tablet: 1 tab(s) orally every 8 hours as needed for  muscle spasm MDD: 3 tabs  Narcan 4 mg/0.1 mL nasal spray: 1 spray(s) intranasally once a day as needed for opiate overdose MDD: 1  oxyCODONE 5 mg oral tablet: 1 tab(s) orally every 6 hours as needed for  severe pain MDD: 4 tabs  polyethylene glycol 3350 oral powder for reconstitution: 17 gram(s) orally 2 times a day  senna leaf extract oral tablet: 2 tab(s) orally once a day (at bedtime)

## 2024-04-26 NOTE — DISCHARGE NOTE PROVIDER - CARE PROVIDER_API CALL
Sav Newell.  Neurosurgery  [address] [place] [address]  Phone: [phone]  Fax: [phone]  Follow Up Time:

## 2024-04-26 NOTE — DISCHARGE NOTE PROVIDER - CARE PROVIDERS DIRECT ADDRESSES
[email or web address]

## 2024-04-26 NOTE — DISCHARGE NOTE PROVIDER - HOSPITAL COURSE
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    Hospital Course:  4/24: POD0 from L5-S1 TLIF. Reglan 10mg IVP x1 for nausea. Prochloperazine 2.5mg IVP for nausea. Rowan removed.   4/25: POD 1. MARTÍNEZ overnight, neuro stable.   4/26: POD 2, MARTÍNEZ o/n. Bowel regimen increased.    Patient evaluated by PT/OT who recommended:  Patient is going home? rehab? hospice? Facility Name:     Hospital course non complicated     Exam on day of discharge:    Checklist:   - Obtained follow up appointment from NP  - Reviewed final recommendations of inpatient consults  - review discharge planning on provider handoff  - post op imaging completed  - Neurologically stable for discharge  - Vitals stable for discharge   - Afebrile for discharge  - WBC is stable  - Sodium level is normal  - Pain is adequately controlled  - Pt has PICC/walker/brace/collar   - LACE score (10 or > needs PCP apt)   - stroke patient? 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    Hospital Course:  4/24: POD0 from L5-S1 TLIF. Reglan 10mg IVP x1 for nausea. Prochloperazine 2.5mg IVP for nausea. Rowan removed.   4/25: POD 1. SILVIA overnight, neuro stable.   4/26: POD 2, SILVIA o/n. Bowel regimen increased.  4/27: POD 3. SILVIA o/n. Post op xrays complete.   4/28: POD 4. Silvia o/n.   4/29: POD 5. SILVIA o/n. HMV removed.     Patient evaluated by PT/OT who recommended: Home no needs   Patient is going home    Hospital course non complicated     Exam on day of discharge:  Constitutional: NAD, resting comfortably in bed.   HEENT: Pupils equal, round, reactive to light. EOMI. Face symmetric, tongue midline.  Respiratory: No respiratory distress  Cardiovascular: RRR, S1, S2.   Gastrointestinal: Abdomen soft, non tender, nondistended.  Neurological:  AAOX3. Opens eyes. Follows commands. Speech clear.  Cranial Nerves: II-XII intact  Motor: 5/5 power in b/l UE and LE  Sensation: intact to light touch in all extremities  Pronator Drift: none  Dysmetria: none  Extremities: Warm, well perfused.  Wounds: Posterior surgical incision, c/d/i, + staples     Checklist:   -Patient is neurologically and hemodynamically stable for discharge. Pain controlled, vitals stable, afebrile. 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    Hospital Course:  4/24: POD0 from L5-S1 TLIF. Reglan 10mg IVP x1 for nausea. Prochloperazine 2.5mg IVP for nausea. Rowan removed.   4/25: POD 1. SILVIA overnight, neuro stable.   4/26: POD 2, SILVIA o/n. Bowel regimen increased.  4/27: POD 3. SILVIA o/n. Post op xrays complete.   4/28: POD 4. Silvia o/n.   4/29: POD 5. SILVIA o/n. HMV removed.     Patient evaluated by PT/OT who recommended: Home no needs   Patient is going home    Hospital course non complicated     Exam on day of discharge:  Constitutional: NAD, resting comfortably in bed.   HEENT: Pupils equal, round, reactive to light. EOMI. Face symmetric, tongue midline.  Respiratory: No respiratory distress  Cardiovascular: RRR, S1, S2.   Gastrointestinal: Abdomen soft, non tender, nondistended.  Neurological:  AAOX3. Opens eyes. Follows commands. Speech clear.  Cranial Nerves: II-XII intact  Motor: 5/5 power in b/l UE and LE  Sensation: intact to light touch in all extremities  Pronator Drift: none  Dysmetria: none  Extremities: Warm, well perfused.  Wounds: Posterior surgical incision, c/d/i, + staples     Checklist:   -Patient is neurologically and hemodynamically stable for discharge. Pain controlled, vitals stable, afebrile. Home per Dr. Newell.

## 2024-04-27 LAB
ANION GAP SERPL CALC-SCNC: 7 MMOL/L — SIGNIFICANT CHANGE UP (ref 5–17)
BUN SERPL-MCNC: 9 MG/DL — SIGNIFICANT CHANGE UP (ref 7–23)
CALCIUM SERPL-MCNC: 9.3 MG/DL — SIGNIFICANT CHANGE UP (ref 8.4–10.5)
CHLORIDE SERPL-SCNC: 99 MMOL/L — SIGNIFICANT CHANGE UP (ref 96–108)
CO2 SERPL-SCNC: 28 MMOL/L — SIGNIFICANT CHANGE UP (ref 22–31)
CREAT SERPL-MCNC: 0.76 MG/DL — SIGNIFICANT CHANGE UP (ref 0.5–1.3)
EGFR: 104 ML/MIN/1.73M2 — SIGNIFICANT CHANGE UP
GLUCOSE SERPL-MCNC: 112 MG/DL — HIGH (ref 70–99)
HCT VFR BLD CALC: 30.3 % — LOW (ref 34.5–45)
HGB BLD-MCNC: 10.5 G/DL — LOW (ref 11.5–15.5)
MAGNESIUM SERPL-MCNC: 2 MG/DL — SIGNIFICANT CHANGE UP (ref 1.6–2.6)
MCHC RBC-ENTMCNC: 27.1 PG — SIGNIFICANT CHANGE UP (ref 27–34)
MCHC RBC-ENTMCNC: 34.7 GM/DL — SIGNIFICANT CHANGE UP (ref 32–36)
MCV RBC AUTO: 78.1 FL — LOW (ref 80–100)
NRBC # BLD: 0 /100 WBCS — SIGNIFICANT CHANGE UP (ref 0–0)
PHOSPHATE SERPL-MCNC: 3.1 MG/DL — SIGNIFICANT CHANGE UP (ref 2.5–4.5)
PLATELET # BLD AUTO: 266 K/UL — SIGNIFICANT CHANGE UP (ref 150–400)
POTASSIUM SERPL-MCNC: 4.2 MMOL/L — SIGNIFICANT CHANGE UP (ref 3.5–5.3)
POTASSIUM SERPL-SCNC: 4.2 MMOL/L — SIGNIFICANT CHANGE UP (ref 3.5–5.3)
RBC # BLD: 3.88 M/UL — SIGNIFICANT CHANGE UP (ref 3.8–5.2)
RBC # FLD: 15.3 % — HIGH (ref 10.3–14.5)
SODIUM SERPL-SCNC: 134 MMOL/L — LOW (ref 135–145)
WBC # BLD: 10.6 K/UL — HIGH (ref 3.8–10.5)
WBC # FLD AUTO: 10.6 K/UL — HIGH (ref 3.8–10.5)

## 2024-04-27 PROCEDURE — 72100 X-RAY EXAM L-S SPINE 2/3 VWS: CPT | Mod: 26

## 2024-04-27 PROCEDURE — 99233 SBSQ HOSP IP/OBS HIGH 50: CPT

## 2024-04-27 RX ORDER — ACETAMINOPHEN 500 MG
650 TABLET ORAL EVERY 6 HOURS
Refills: 0 | Status: DISCONTINUED | OUTPATIENT
Start: 2024-04-27 | End: 2024-04-28

## 2024-04-27 RX ADMIN — GABAPENTIN 300 MILLIGRAM(S): 400 CAPSULE ORAL at 21:09

## 2024-04-27 RX ADMIN — OXYCODONE HYDROCHLORIDE 5 MILLIGRAM(S): 5 TABLET ORAL at 15:56

## 2024-04-27 RX ADMIN — OXYCODONE HYDROCHLORIDE 5 MILLIGRAM(S): 5 TABLET ORAL at 14:59

## 2024-04-27 RX ADMIN — OXYCODONE HYDROCHLORIDE 10 MILLIGRAM(S): 5 TABLET ORAL at 02:25

## 2024-04-27 RX ADMIN — Medication 650 MILLIGRAM(S): at 19:43

## 2024-04-27 RX ADMIN — OXYCODONE HYDROCHLORIDE 10 MILLIGRAM(S): 5 TABLET ORAL at 21:09

## 2024-04-27 RX ADMIN — POLYETHYLENE GLYCOL 3350 17 GRAM(S): 17 POWDER, FOR SOLUTION ORAL at 05:23

## 2024-04-27 RX ADMIN — GABAPENTIN 300 MILLIGRAM(S): 400 CAPSULE ORAL at 05:23

## 2024-04-27 RX ADMIN — OXYCODONE HYDROCHLORIDE 10 MILLIGRAM(S): 5 TABLET ORAL at 22:09

## 2024-04-27 RX ADMIN — METHOCARBAMOL 500 MILLIGRAM(S): 500 TABLET, FILM COATED ORAL at 05:24

## 2024-04-27 RX ADMIN — OXYCODONE HYDROCHLORIDE 10 MILLIGRAM(S): 5 TABLET ORAL at 01:25

## 2024-04-27 NOTE — PROGRESS NOTE ADULT - ASSESSMENT
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    #Spondylolysis  #Post op state  - s/p L5-S1 TLIF (4/24)  - pain control per primary team, c/w bowel regimen (now having bowel movements)   - PT/OT eval - No needs  - DVT ppx: SCDs  - encourage incentive spirometer, OOB as tolerated    #Constipation - Improved  - continue miralax BID w senna  - encourage mobilization    #Psoriasis  - on Dupixent, held week of OR  - f/u with Derm post op for possible transition to Bimekizumab    #Leukocytosis  - WBC 11-12, likely reactive post op  - no infectious symptoms    #Acute blood loss anemia  - likely component of surgical blood loss, HDS, no signs of active bleeding  - Transfuse Hgb < 7    #HLD  - outpatient labs with mildly elevated cholesterol  - No statin indicated at this time, low ASVCD risk  - PCP f/u, lifestyle modification    Dispo: home pending drain output, likely in 24-48 hours

## 2024-04-27 NOTE — PROGRESS NOTE ADULT - SUBJECTIVE AND OBJECTIVE BOX
SUBJECTIVE: Patient reports feeling tired. Denies new weakness, numbness, tingling, nausea, vomiting, chest pain, palpitations, shortness of breath, vision changes.     HOSPITAL COURSE:  4/24: POD0 from L5-S1 TLIF. Reglan 10 mg IVP x1 for nausea. Prochloperazine 2.5mg IVP for nausea. Rowan removed.   4/25: POD 1. MARTÍNEZ overnight, neuro stable.   4/26: POD 2, MARTÍNEZ o/n. Bowel regimen increased.  4/27: POD 3. MARTÍNEZ o/n.     Vital Signs Last 24 Hrs  T(C): 36.6 (26 Apr 2024 20:17), Max: 37.7 (26 Apr 2024 16:28)  T(F): 97.8 (26 Apr 2024 20:17), Max: 99.8 (26 Apr 2024 16:28)  HR: 88 (26 Apr 2024 20:17) (77 - 91)  BP: 105/65 (26 Apr 2024 20:17) (100/65 - 110/71)  BP(mean): 71 (26 Apr 2024 08:19) (71 - 71)  RR: 16 (26 Apr 2024 20:17) (16 - 16)  SpO2: 98% (26 Apr 2024 20:17) (98% - 100%)    Parameters below as of 26 Apr 2024 20:17  Patient On (Oxygen Delivery Method): room air    I&O's Summary    25 Apr 2024 07:01  -  26 Apr 2024 07:00  --------------------------------------------------------  IN: 0 mL / OUT: 220 mL / NET: -220 mL    26 Apr 2024 07:01  -  27 Apr 2024 01:34  --------------------------------------------------------  IN: 0 mL / OUT: 50 mL / NET: -50 mL    PHYSICAL EXAM:  Constitutional: NAD, resting comfortably in bed.   HEENT: Pupils equal, round, reactive to light. EOMI. Face symmetric, tongue midline.  Respiratory: No respiratory distress, lungs clear to auscultation bilaterally.   Cardiovascular: RRR, S1, S2.   Gastrointestinal: Abdomen soft, non tender, nondistended.  Neurological:  AAOX3. Opens eyes. Follows commands. Speech clear.  Cranial Nerves: II-XII intact  Motor: 5/5 power in b/l UE and LE  Sensation: intact to light touch in all extremities  Pronator Drift: none  Dysmetria: none  Extremities: Warm, well perfused.  Wounds: L5-S1 TLIF surgical incision, c/d/i      LABS:                        10.0   11.02 )-----------( 230      ( 26 Apr 2024 05:30 )             29.3     04-26    137  |  102  |  7   ----------------------------<  95  4.0   |  29  |  0.64    Ca    8.6      26 Apr 2024 05:30  Phos  3.0     04-26  Mg     2.0     04-26        Urinalysis Basic - ( 26 Apr 2024 05:30 )    Color: x / Appearance: x / SG: x / pH: x  Gluc: 95 mg/dL / Ketone: x  / Bili: x / Urobili: x   Blood: x / Protein: x / Nitrite: x   Leuk Esterase: x / RBC: x / WBC x   Sq Epi: x / Non Sq Epi: x / Bacteria: x          CAPILLARY BLOOD GLUCOSE          Drug Levels: [] N/A    CSF Analysis: [] N/A      Allergies    No Known Allergies    Intolerances      MEDICATIONS:  Antibiotics:    Neuro:  gabapentin 300 milliGRAM(s) Oral every 8 hours  methocarbamol 500 milliGRAM(s) Oral every 8 hours  oxyCODONE    IR 5 milliGRAM(s) Oral every 4 hours PRN  oxyCODONE    IR 10 milliGRAM(s) Oral every 4 hours PRN    Anticoagulation:    OTHER:  polyethylene glycol 3350 17 Gram(s) Oral two times a day  senna 2 Tablet(s) Oral at bedtime    IVF:    CULTURES:    RADIOLOGY & ADDITIONAL TESTS:      ASSESSMENT:  37 y/o female with sickle cell trait and psoriasis (on dupixent [id number]), presents with worsening back pain since age 18 and now has right greater than left leg pain, 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    Plan:  Neuro:  - neuro/vital checks q8  - pain control: ERAS  - monitor HMV output  - pending standing lumbar xrays when HMV removed  - TLSO brace when OOB    Cardiac:  - SBP goal     Pulmonary:  - on RA    GI:  - regular diet  - bowel regimen    Renal:  - IVL  - voiding    Heme:  - SCDs for DVT prophylaxis, hold chemoprophylaxis due to postop status    Endo:  - no issues    ID:  - postop ancef    Disposition: regional status, full code, PT rec no needs    D/w Dr. Newell

## 2024-04-27 NOTE — PROGRESS NOTE ADULT - SUBJECTIVE AND OBJECTIVE BOX
INTERVAL EVENTS: having bowel movements, no complaints    PAST MEDICAL & SURGICAL HISTORY:  Spondylolysis of lumbar region    History of sickle cell trait    Infectious gastroenteritis    Previous  section  x2        MEDICATIONS  (STANDING):  gabapentin 300 milliGRAM(s) Oral every 8 hours  methocarbamol 500 milliGRAM(s) Oral every 8 hours  polyethylene glycol 3350 17 Gram(s) Oral two times a day  senna 2 Tablet(s) Oral at bedtime    MEDICATIONS  (PRN):  oxyCODONE    IR 5 milliGRAM(s) Oral every 4 hours PRN Moderate Pain (4 - 6)  oxyCODONE    IR 10 milliGRAM(s) Oral every 4 hours PRN Severe Pain (7 - 10)    T(F): 97.9 (24 @ 08:49), Max: 99.8 (24 @ 16:28)  HR: 88 (24 @ 08:49) (88 - 92)  BP: 100/67 (24 @ 08:49) (100/65 - 113/68)  BP(mean): --  ABP: --  ABP(mean): --  RR: 15 (24 @ 08:49) (15 - 17)  SpO2: 97% (24 @ 08:49) (97% - 100%)    I/O Detail 24H    2024 07:01  -  2024 07:00  --------------------------------------------------------  IN:  Total IN: 0 mL    OUT:    Drain (mL): 110 mL  Total OUT: 110 mL    Total NET: -110 mL      2024 07:01  -  2024 12:10  --------------------------------------------------------  IN:    Oral Fluid: 300 mL  Total IN: 300 mL    OUT:    Drain (mL): 30 mL  Total OUT: 30 mL    Total NET: 270 mL          PHYSICAL EXAM:  Constitutional: NAD, comfortable in bed.  HEENT: NC/AT, PERRLA, EOMI, MMM  Neck: Supple, no JVD  Respiratory: CTA B/L. No w/r/r.   Cardiovascular: RRR, normal S1 and S2, no m/r/g.   Gastrointestinal: +BS mildly hypoactive, soft NTND   Extremities: wwp; no cyanosis, clubbing or edema.   Vascular: Pulses equal and strong throughout.   Neurological: AAOx3, no CN deficits, strength and sensation intact throughout.   Skin: Back dressing c/d/i, HMV with serosanguinous drainage    LABS:  CBC 24 @ 05:30                        10.5   10.60 )-----------( 266                   30.3       Hgb trend: 10.5 <-- , 10.0 <-- , 9.8 <-- , 10.7 <--   WBC trend: 10.60 <-- , 11.02 <-- , 12.72 <-- , 11.22 <--       CMP 24 @ 05:30    134<L>  |  99  |  9   ----------------------------<  112<H>  4.2   |  28  |  0.76    Ca    9.3      24 @ 05:30  Phos  3.1       Mg     2.0             Serum Cr trend: 0.76 <-- , 0.64 <-- , 0.62 <-- , 0.56 <--         Cardiac Markers           STUDIES:

## 2024-04-28 PROCEDURE — 99232 SBSQ HOSP IP/OBS MODERATE 35: CPT

## 2024-04-28 PROCEDURE — 99232 SBSQ HOSP IP/OBS MODERATE 35: CPT | Mod: 24

## 2024-04-28 RX ORDER — ONDANSETRON 8 MG/1
4 TABLET, FILM COATED ORAL EVERY 6 HOURS
Refills: 0 | Status: DISCONTINUED | OUTPATIENT
Start: 2024-04-28 | End: 2024-04-29

## 2024-04-28 RX ADMIN — GABAPENTIN 300 MILLIGRAM(S): 400 CAPSULE ORAL at 15:01

## 2024-04-28 RX ADMIN — Medication 1 CAPSULE(S): at 19:48

## 2024-04-28 RX ADMIN — POLYETHYLENE GLYCOL 3350 17 GRAM(S): 17 POWDER, FOR SOLUTION ORAL at 18:48

## 2024-04-28 RX ADMIN — METHOCARBAMOL 500 MILLIGRAM(S): 500 TABLET, FILM COATED ORAL at 21:32

## 2024-04-28 RX ADMIN — GABAPENTIN 300 MILLIGRAM(S): 400 CAPSULE ORAL at 21:32

## 2024-04-28 RX ADMIN — SENNA PLUS 2 TABLET(S): 8.6 TABLET ORAL at 21:32

## 2024-04-28 RX ADMIN — GABAPENTIN 300 MILLIGRAM(S): 400 CAPSULE ORAL at 05:29

## 2024-04-28 RX ADMIN — Medication 1 CAPSULE(S): at 18:48

## 2024-04-28 NOTE — PROGRESS NOTE ADULT - SUBJECTIVE AND OBJECTIVE BOX
INTERVAL EVENTS: no acute events or complaints    PAST MEDICAL & SURGICAL HISTORY:  Spondylolysis of lumbar region    History of sickle cell trait    Infectious gastroenteritis    Previous  section  x2        MEDICATIONS  (STANDING):  gabapentin 300 milliGRAM(s) Oral every 8 hours  methocarbamol 500 milliGRAM(s) Oral every 8 hours  polyethylene glycol 3350 17 Gram(s) Oral two times a day  senna 2 Tablet(s) Oral at bedtime    MEDICATIONS  (PRN):  acetaminophen     Tablet .. 650 milliGRAM(s) Oral every 6 hours PRN Mild Pain (1 - 3)  oxyCODONE    IR 5 milliGRAM(s) Oral every 4 hours PRN Moderate Pain (4 - 6)  oxyCODONE    IR 10 milliGRAM(s) Oral every 4 hours PRN Severe Pain (7 - 10)    T(F): 98.7 (24 @ 08:57), Max: 98.8 (24 @ 20:06)  HR: 88 (24 @ 08:57) (86 - 98)  BP: 96/62 (24 @ 08:57) (96/62 - 115/78)  BP(mean): --  ABP: --  ABP(mean): --  RR: 16 (24 @ 08:57) (16 - 18)  SpO2: 95% (24 @ 08:57) (95% - 98%)    I/O Detail 24H    2024 07:01  -  2024 07:00  --------------------------------------------------------  IN:    Oral Fluid: 650 mL  Total IN: 650 mL    OUT:    Drain (mL): 77 mL  Total OUT: 77 mL    Total NET: 573 mL          PHYSICAL EXAM:  Constitutional: NAD, comfortable in bed.  HEENT: NC/AT, PERRLA, EOMI, MMM  Neck: Supple, no JVD  Respiratory: CTA B/L. No w/r/r.   Cardiovascular: RRR, normal S1 and S2, no m/r/g.   Gastrointestinal: +BS mildly hypoactive, soft NTND   Extremities: wwp; no cyanosis, clubbing or edema.   Vascular: Pulses equal and strong throughout.   Neurological: AAOx3, no CN deficits, strength and sensation intact throughout.   Skin: Back dressing c/d/i, HMV with serosanguinous drainage    LABS:  CBC 24 @ 05:30                        10.5   10.60 )-----------( 266                   30.3       Hgb trend: 10.5 <-- , 10.0 <--   WBC trend: 10.60 <-- , 11.02 <--       CMP 24 @ 05:30    134<L>  |  99  |  9   ----------------------------<  112<H>  4.2   |  28  |  0.76    Phos  3.1       Mg     2.0             Serum Cr trend: 0.76 <-- , 0.64 <--         Cardiac Markers           STUDIES:

## 2024-04-28 NOTE — PROGRESS NOTE ADULT - ASSESSMENT
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    #Spondylolysis  #Post op state  - s/p L5-S1 TLIF (4/24)  - pain control per primary team, c/w bowel regimen (now having bowel movements)   - PT/OT eval - No needs  - DVT ppx: SCDs  - encourage incentive spirometer, OOB as tolerated    #Constipation - Improved  - continue miralax BID w senna  - encourage mobilization    #Psoriasis  - on Dupixent, held week of OR  - f/u with Derm post op for possible transition to Bimekizumab    #Leukocytosis  WBC 11-12, likely reactive post op  - no infectious symptoms    #Acute blood loss anemia  - likely component of surgical blood loss, HDS, no signs of active bleeding  - Transfuse Hgb < 7    #HLD  - outpatient labs with mildly elevated cholesterol  - No statin indicated at this time, low ASVCD risk  - PCP f/u, lifestyle modification    Dispo: home pending drain output, likely 4/29/24

## 2024-04-28 NOTE — PROGRESS NOTE ADULT - SUBJECTIVE AND OBJECTIVE BOX
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 In  back pain started radiating down both legs to feet, much worse on right side. Patient has increased back and leg paresthesias with ambulation c/w neurogenic claudication.     24 Pt complains of low back pain radiating down BL legs. Pt complains of pain, numbness and tingling in both legs down to toes. Pain started in left leg now is in both legs. Pt states she cannot sleep at night d/t pain and cannot lay flat on her back. Pt cannot sit or stand for too long. Pt denies any recent falls, bowel/bladder incontinence. Pt is ambulating today without assistive device.  MRI and Xray Reviewed. L5 congenital spondylolysis reviewed.    Lower back pain is constant. Reports she stumbles often with ambulation, denies any falls. Denies weakness in legs. Does not take anything pain. Pain worse with ambulation, improves with rest.   "    37 y/o female with sickle cell trait and psoriasis (on dupixent [id number]), presents for spine surgery today. She reports worsening back pain since age 18 and now has right greater than left leg pain.  She is taking no pain medication.  She denies bowel or bladder changes, saddle incontinence.  She has numbness in both feet and intermittently down the right leg.   (2024 06:41)    OVERNIGHT EVENTS: YOHANNES, neuro stable.     Hospital Course:   : POD0 from L5-S1 TLIF. Reglan 10 mg IVP x1 for nausea. Prochloperazine 2.5mg IVP for nausea. Rowan removed.   : POD 1. YOHANNES overnight, neuro stable.   : POD 2, YOHANNES o/n. Bowel regimen increased.  : POD 3. YOHANNES o/n. Post op xrays complete.   : POD 4. yohannes ovn.     Vital Signs Last 24 Hrs  T(C): 37.1 (2024 20:06), Max: 37.1 (2024 20:06)  T(F): 98.8 (2024 20:06), Max: 98.8 (2024 20:06)  HR: 98 (2024 20:06) (86 - 98)  BP: 97/61 (2024 20:06) (97/61 - 113/68)  BP(mean): --  RR: 16 (2024 20:06) (15 - 17)  SpO2: 98% (2024 20:06) (97% - 98%)    Parameters below as of 2024 20:06  Patient On (Oxygen Delivery Method): room air        I&O's Summary    2024 07:01  -  2024 07:00  --------------------------------------------------------  IN: 0 mL / OUT: 110 mL / NET: -110 mL    2024 07:01  -  2024 00:12  --------------------------------------------------------  IN: 650 mL / OUT: 52 mL / NET: 598 mL        PHYSICAL EXAM:  Constitutional: NAD, resting comfortably in bed.   HEENT: Pupils equal, round, reactive to light. EOMI. Face symmetric, tongue midline.  Respiratory: No respiratory distress  Cardiovascular: RRR, S1, S2.   Gastrointestinal: Abdomen soft, non tender, nondistended.  Neurological:  AAOX3. Opens eyes. Follows commands. Speech clear.  Cranial Nerves: II-XII intact  Motor: 5/5 power in b/l UE and LE  Sensation: intact to light touch in all extremities  Pronator Drift: none  Dysmetria: none  Extremities: Warm, well perfused.  Wounds: Posterior surgical incision, c/d/i      LABS:                        10.5   10.60 )-----------( 266      ( 2024 05:30 )             30.3     04-    134<L>  |  99  |  9   ----------------------------<  112<H>  4.2   |  28  |  0.76    Ca    9.3      2024 05:30  Phos  3.1       Mg     2.0             Urinalysis Basic - ( 2024 05:30 )    Color: x / Appearance: x / SG: x / pH: x  Gluc: 112 mg/dL / Ketone: x  / Bili: x / Urobili: x   Blood: x / Protein: x / Nitrite: x   Leuk Esterase: x / RBC: x / WBC x   Sq Epi: x / Non Sq Epi: x / Bacteria: x          CAPILLARY BLOOD GLUCOSE          Drug Levels: [] N/A    CSF Analysis: [] N/A      Allergies    No Known Allergies    Intolerances      MEDICATIONS:  Antibiotics:    Neuro:  acetaminophen     Tablet .. 650 milliGRAM(s) Oral every 6 hours PRN  gabapentin 300 milliGRAM(s) Oral every 8 hours  methocarbamol 500 milliGRAM(s) Oral every 8 hours  oxyCODONE    IR 5 milliGRAM(s) Oral every 4 hours PRN  oxyCODONE    IR 10 milliGRAM(s) Oral every 4 hours PRN    Anticoagulation:    OTHER:  polyethylene glycol 3350 17 Gram(s) Oral two times a day  senna 2 Tablet(s) Oral at bedtime    IVF:    CULTURES:    RADIOLOGY & ADDITIONAL TESTS:      ASSESSMENT:  37 y/o female with sickle cell trait and psoriasis (on dupixent [id number]), presents with worsening back pain since age 18 and now has right greater than left leg pain, 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    M48.062    No pertinent family history in first degree relatives    Handoff    MEWS Score    Spondylolysis of lumbar region    History of sickle cell trait    Infectious gastroenteritis    Spondylolisthesis at L5-S1 level    Spondylolisthesis at L5-S1 level    Fusion, spine, lumbar, TLIF, 1-2 levels    Previous  section    SysAdmin_VstLnk        PLAN:  Neuro:  - neuro/vital checks q8  - pain control: ERAS  - monitor HMV output  - post op xrays complete   - TLSO brace when OOB    Cardiac:  - SBP goal     Pulmonary:  - on RA    GI:  - regular diet  - bowel regimen  - last BM     Renal:  - IVL  - voiding    Heme:  - SCDs for DVT prophylaxis, hold chemoprophylaxis due to postop status    Endo:  - no issues    ID:  - postop ancef    Disposition: regional status, full code, PT rec no needs    D/w Dr. Newell  Assessment:  Present when checked    []  GCS  E   V  M     Heart Failure: []Acute, [] acute on chronic , []chronic  Heart Failure:  [] Diastolic (HFpEF), [] Systolic (HFrEF), []Combined (HFpEF and HFrEF), [] RHF, [] Pulm HTN, [] Other    [] RICHARDSON, [] ATN, [] AIN, [] other  [] CKD1, [] CKD2, [] CKD 3, [] CKD 4, [] CKD 5, []ESRD    Encephalopathy: [] Metabolic, [] Hepatic, [] toxic, [] Neurological, [] Other    Abnormal Nurtitional Status: [] malnurtition (see nutrition note), [ ]underweight: BMI < 19, [] morbid obesity: BMI >40, [] Cachexia    [] Sepsis  [] hypovolemic shock,[] cardiogenic shock, [] hemorrhagic shock, [] neuogenic shock  [] Acute Respiratory Failure  []Cerebral edema, [] Brain compression/ herniation,   [] Functional quadriplegia  [] Acute blood loss anemia

## 2024-04-29 ENCOUNTER — TRANSCRIPTION ENCOUNTER (OUTPATIENT)
Age: 39
End: 2024-04-29

## 2024-04-29 VITALS
DIASTOLIC BLOOD PRESSURE: 63 MMHG | OXYGEN SATURATION: 98 % | HEART RATE: 99 BPM | SYSTOLIC BLOOD PRESSURE: 110 MMHG | TEMPERATURE: 98 F | RESPIRATION RATE: 16 BRPM

## 2024-04-29 PROCEDURE — 86901 BLOOD TYPING SEROLOGIC RH(D): CPT

## 2024-04-29 PROCEDURE — 97116 GAIT TRAINING THERAPY: CPT

## 2024-04-29 PROCEDURE — 84100 ASSAY OF PHOSPHORUS: CPT

## 2024-04-29 PROCEDURE — 36415 COLL VENOUS BLD VENIPUNCTURE: CPT

## 2024-04-29 PROCEDURE — C1713: CPT

## 2024-04-29 PROCEDURE — 97162 PT EVAL MOD COMPLEX 30 MIN: CPT

## 2024-04-29 PROCEDURE — 99232 SBSQ HOSP IP/OBS MODERATE 35: CPT | Mod: 24

## 2024-04-29 PROCEDURE — 80048 BASIC METABOLIC PNL TOTAL CA: CPT

## 2024-04-29 PROCEDURE — C1889: CPT

## 2024-04-29 PROCEDURE — 86850 RBC ANTIBODY SCREEN: CPT

## 2024-04-29 PROCEDURE — 76000 FLUOROSCOPY <1 HR PHYS/QHP: CPT

## 2024-04-29 PROCEDURE — 85027 COMPLETE CBC AUTOMATED: CPT

## 2024-04-29 PROCEDURE — 72100 X-RAY EXAM L-S SPINE 2/3 VWS: CPT

## 2024-04-29 PROCEDURE — 83735 ASSAY OF MAGNESIUM: CPT

## 2024-04-29 PROCEDURE — 86900 BLOOD TYPING SEROLOGIC ABO: CPT

## 2024-04-29 RX ORDER — GABAPENTIN 400 MG/1
1 CAPSULE ORAL
Qty: 42 | Refills: 0
Start: 2024-04-29 | End: 2024-05-12

## 2024-04-29 RX ORDER — SIMETHICONE 80 MG/1
80 TABLET, CHEWABLE ORAL
Refills: 0 | Status: DISCONTINUED | OUTPATIENT
Start: 2024-04-29 | End: 2024-04-29

## 2024-04-29 RX ORDER — NALOXONE HYDROCHLORIDE 4 MG/.1ML
1 SPRAY NASAL
Qty: 1 | Refills: 0
Start: 2024-04-29 | End: 2024-04-29

## 2024-04-29 RX ORDER — POLYETHYLENE GLYCOL 3350 17 G/17G
17 POWDER, FOR SOLUTION ORAL
Qty: 0 | Refills: 0 | DISCHARGE
Start: 2024-04-29

## 2024-04-29 RX ORDER — OXYCODONE HYDROCHLORIDE 5 MG/1
1 TABLET ORAL
Qty: 20 | Refills: 0
Start: 2024-04-29 | End: 2024-05-03

## 2024-04-29 RX ORDER — SENNA PLUS 8.6 MG/1
2 TABLET ORAL
Qty: 0 | Refills: 0 | DISCHARGE
Start: 2024-04-29

## 2024-04-29 RX ORDER — METHOCARBAMOL 500 MG/1
1 TABLET, FILM COATED ORAL
Qty: 21 | Refills: 0
Start: 2024-04-29 | End: 2024-05-05

## 2024-04-29 RX ADMIN — POLYETHYLENE GLYCOL 3350 17 GRAM(S): 17 POWDER, FOR SOLUTION ORAL at 05:25

## 2024-04-29 RX ADMIN — GABAPENTIN 300 MILLIGRAM(S): 400 CAPSULE ORAL at 05:26

## 2024-04-29 RX ADMIN — METHOCARBAMOL 500 MILLIGRAM(S): 500 TABLET, FILM COATED ORAL at 05:26

## 2024-04-29 RX ADMIN — SIMETHICONE 80 MILLIGRAM(S): 80 TABLET, CHEWABLE ORAL at 03:31

## 2024-04-29 NOTE — DISCHARGE NOTE NURSING/CASE MANAGEMENT/SOCIAL WORK - NSDCPEFALRISK_GEN_ALL_CORE
For information on Fall & Injury Prevention, visit: [email or web address] OR  [email or web address] OR  https://www.cdc.gov/steadi/patient.html

## 2024-04-29 NOTE — PROGRESS NOTE ADULT - SUBJECTIVE AND OBJECTIVE BOX
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 In  back pain started radiating down both legs to feet, much worse on right side. Patient has increased back and leg paresthesias with ambulation c/w neurogenic claudication.     24 Pt complains of low back pain radiating down BL legs. Pt complains of pain, numbness and tingling in both legs down to toes. Pain started in left leg now is in both legs. Pt states she cannot sleep at night d/t pain and cannot lay flat on her back. Pt cannot sit or stand for too long. Pt denies any recent falls, bowel/bladder incontinence. Pt is ambulating today without assistive device.  MRI and Xray Reviewed. L5 congenital spondylolysis reviewed.    Lower back pain is constant. Reports she stumbles often with ambulation, denies any falls. Denies weakness in legs. Does not take anything pain. Pain worse with ambulation, improves with rest.   "    39 y/o female with sickle cell trait and psoriasis (on dupixent [id number]), presents for spine surgery today. She reports worsening back pain since age 18 and now has right greater than left leg pain.  She is taking no pain medication.  She denies bowel or bladder changes, saddle incontinence.  She has numbness in both feet and intermittently down the right leg.   (2024 06:41)    OVERNIGHT EVENTS: Nauseous given IV zofran.     Hospital Course:   : POD0 from L5-S1 TLIF. Reglan 10 mg IVP x1 for nausea. Prochloperazine 2.5mg IVP for nausea. Rowan removed.   : POD 1. YOHANNES overnight, neuro stable.   : POD 2, YOHANNES o/n. Bowel regimen increased.  : POD 3. YOHANNES o/n. Post op xrays complete.   : POD 4. yohannes ovn.   : POD 5. YOHANNES ovn.     Vital Signs Last 24 Hrs  T(C): 36.7 (2024 20:40), Max: 37.1 (2024 08:57)  T(F): 98 (2024 20:40), Max: 98.7 (2024 08:57)  HR: 100 (2024 20:40) (88 - 100)  BP: 121/80 (2024 20:40) (96/62 - 121/80)  BP(mean): --  RR: 17 (2024 20:40) (16 - 18)  SpO2: 99% (2024 20:40) (95% - 99%)    Parameters below as of 2024 20:40  Patient On (Oxygen Delivery Method): room air        I&O's Summary    2024 07:01  -  2024 07:00  --------------------------------------------------------  IN: 650 mL / OUT: 77 mL / NET: 573 mL    2024 07:01  -  2024 00:21  --------------------------------------------------------  IN: 0 mL / OUT: 55 mL / NET: -55 mL        PHYSICAL EXAM:  Constitutional: NAD, resting comfortably in bed.   HEENT: Pupils equal, round, reactive to light. EOMI. Face symmetric, tongue midline.  Respiratory: No respiratory distress  Cardiovascular: RRR, S1, S2.   Gastrointestinal: Abdomen soft, non tender, nondistended.  Neurological:  AAOX3. Opens eyes. Follows commands. Speech clear.  Cranial Nerves: II-XII intact  Motor: 5/5 power in b/l UE and LE  Sensation: intact to light touch in all extremities  Pronator Drift: none  Dysmetria: none  Extremities: Warm, well perfused.  Wounds: Posterior surgical incision, c/d/i, HMV drain x 1      LABS:                        10.5   10.60 )-----------( 266      ( 2024 05:30 )             30.3     04-27    134<L>  |  99  |  9   ----------------------------<  112<H>  4.2   |  28  |  0.76    Ca    9.3      2024 05:30  Phos  3.1       Mg     2.0             Urinalysis Basic - ( 2024 05:30 )    Color: x / Appearance: x / SG: x / pH: x  Gluc: 112 mg/dL / Ketone: x  / Bili: x / Urobili: x   Blood: x / Protein: x / Nitrite: x   Leuk Esterase: x / RBC: x / WBC x   Sq Epi: x / Non Sq Epi: x / Bacteria: x          CAPILLARY BLOOD GLUCOSE          Drug Levels: [] N/A    CSF Analysis: [] N/A      Allergies    No Known Allergies    Intolerances      MEDICATIONS:  Antibiotics:    Neuro:  acetaminophen 300 mG/butalbital 50 mG/ caffeine 40 mG 1 Capsule(s) Oral every 6 hours PRN  gabapentin 300 milliGRAM(s) Oral every 8 hours  methocarbamol 500 milliGRAM(s) Oral every 8 hours  ondansetron Injectable 4 milliGRAM(s) IV Push every 6 hours PRN  oxyCODONE    IR 5 milliGRAM(s) Oral every 4 hours PRN  oxyCODONE    IR 10 milliGRAM(s) Oral every 4 hours PRN    Anticoagulation:    OTHER:  polyethylene glycol 3350 17 Gram(s) Oral two times a day  senna 2 Tablet(s) Oral at bedtime    IVF:    CULTURES:    RADIOLOGY & ADDITIONAL TESTS:      ASSESSMENT:  39 y/o female with sickle cell trait and psoriasis (on dupixent [id number]), presents with worsening back pain since age 18 and now has right greater than left leg pain, 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    M48.062    No pertinent family history in first degree relatives    Handoff    MEWS Score    Spondylolysis of lumbar region    History of sickle cell trait    Infectious gastroenteritis    Spondylolisthesis at L5-S1 level    Spondylolisthesis at L5-S1 level    Fusion, spine, lumbar, TLIF, 1-2 levels    Previous  section    SysAdmin_VstLnk        PLAN:  Neuro:  - neuro/vital checks q8  - pain control: ERAS, fioricet q6h prn   - monitor HMV output  - post op xrays complete   - TLSO brace when OOB    Cardiac:  - SBP goal     Pulmonary:  - on RA    GI:  - regular diet  - bowel regimen  - last BM     Renal:  - IVL  - voiding    Heme:  - SCDs for DVT prophylaxis, hold chemoprophylaxis due to postop status    Endo:  - no issues    ID:  - postop ancef    Disposition: regional status, full code, PT rec no needs    D/w Dr. Newell  Assessment:  Present when checked    []  GCS  E   V  M     Heart Failure: []Acute, [] acute on chronic , []chronic  Heart Failure:  [] Diastolic (HFpEF), [] Systolic (HFrEF), []Combined (HFpEF and HFrEF), [] RHF, [] Pulm HTN, [] Other    [] RICHARDSON, [] ATN, [] AIN, [] other  [] CKD1, [] CKD2, [] CKD 3, [] CKD 4, [] CKD 5, []ESRD    Encephalopathy: [] Metabolic, [] Hepatic, [] toxic, [] Neurological, [] Other    Abnormal Nurtitional Status: [] malnurtition (see nutrition note), [ ]underweight: BMI < 19, [] morbid obesity: BMI >40, [] Cachexia    [] Sepsis  [] hypovolemic shock,[] cardiogenic shock, [] hemorrhagic shock, [] neuogenic shock  [] Acute Respiratory Failure  []Cerebral edema, [] Brain compression/ herniation,   [] Functional quadriplegia  [] Acute blood loss anemia

## 2024-04-29 NOTE — DISCHARGE NOTE NURSING/CASE MANAGEMENT/SOCIAL WORK - NSDCFUADDAPPT_GEN_ALL_CORE_FT
Please follow up w/ Dr. Newell in the outpatient office in 2 weeks. Call [phone] to confirm appointment date and time.     Please also follow up with your Primary Care Doctor. 
11-Dec-2017 06:42

## 2024-04-29 NOTE — PROCEDURE NOTE - ESTIMATED BLOOD LOSS
None
Severe pharyngeal dysphagia for all consistencies, significantly impaired laryngeal elevation/excursion and no epiglottic retroflexion resulting in poor bolus clearance and significant pharyngeal residue. Swallow is not safe or functional for po intake

## 2024-04-29 NOTE — DISCHARGE NOTE NURSING/CASE MANAGEMENT/SOCIAL WORK - PATIENT PORTAL LINK FT
You can access the FollowMyHealth Patient Portal offered by [facility] by registering at the following website: [email or web address]. By joining [facility]’s FollowMyHealth portal, you will also be able to view your health information using other applications (apps) compatible with our system.

## 2024-04-29 NOTE — PROGRESS NOTE ADULT - REASON FOR ADMISSION
back pain and bilateral leg pain

## 2024-04-29 NOTE — PROGRESS NOTE ADULT - PROVIDER SPECIALTY LIST ADULT
Neurosurgery
Neurosurgery
Cardiology
Hospitalist
Neurosurgery
Hospitalist

## 2024-04-29 NOTE — PROCEDURE NOTE - ADDITIONAL PROCEDURE DETAILS
Pt was informed on steps of procedure, location/patient name/ confirmed. Surgical incision site dressing was removed without difficulty, drain insertion site was visualized. Insertion site and surrounding area was cleaned with chlorhexidine, drain was off suction. Hemovac drain suture was visualized and removed without difficulty. HMV drain was removed without resistance, and pressure was held to insertion site with gauze. Steri strips, gauze, and tegaderm were placed over drain insertion site. Pt tolerated procedure well.

## 2024-04-30 PROBLEM — Z86.2 PERSONAL HISTORY OF DISEASES OF THE BLOOD AND BLOOD-FORMING ORGANS AND CERTAIN DISORDERS INVOLVING THE IMMUNE MECHANISM: Chronic | Status: ACTIVE | Noted: 2024-04-23

## 2024-04-30 PROBLEM — A09 INFECTIOUS GASTROENTERITIS AND COLITIS, UNSPECIFIED: Chronic | Status: ACTIVE | Noted: 2024-04-23

## 2024-04-30 PROBLEM — M43.06 SPONDYLOLYSIS, LUMBAR REGION: Chronic | Status: ACTIVE | Noted: 2024-04-23

## 2024-05-02 PROBLEM — M43.17 ANTEROLISTHESIS OF LUMBOSACRAL SPINE: Status: ACTIVE | Noted: 2023-11-09

## 2024-05-03 DIAGNOSIS — M43.06 SPONDYLOLYSIS, LUMBAR REGION: ICD-10-CM

## 2024-05-03 DIAGNOSIS — M48.062 SPINAL STENOSIS, LUMBAR REGION WITH NEUROGENIC CLAUDICATION: ICD-10-CM

## 2024-05-03 DIAGNOSIS — K59.00 CONSTIPATION, UNSPECIFIED: ICD-10-CM

## 2024-05-03 DIAGNOSIS — D57.3 SICKLE-CELL TRAIT: ICD-10-CM

## 2024-05-03 DIAGNOSIS — D72.828 OTHER ELEVATED WHITE BLOOD CELL COUNT: ICD-10-CM

## 2024-05-03 DIAGNOSIS — L40.9 PSORIASIS, UNSPECIFIED: ICD-10-CM

## 2024-05-03 DIAGNOSIS — M53.2X7 SPINAL INSTABILITIES, LUMBOSACRAL REGION: ICD-10-CM

## 2024-05-03 DIAGNOSIS — M48.07 SPINAL STENOSIS, LUMBOSACRAL REGION: ICD-10-CM

## 2024-05-03 DIAGNOSIS — E78.5 HYPERLIPIDEMIA, UNSPECIFIED: ICD-10-CM

## 2024-05-03 DIAGNOSIS — Q76.2 CONGENITAL SPONDYLOLISTHESIS: ICD-10-CM

## 2024-05-03 DIAGNOSIS — M54.17 RADICULOPATHY, LUMBOSACRAL REGION: ICD-10-CM

## 2024-05-06 ENCOUNTER — APPOINTMENT (OUTPATIENT)
Dept: NEUROSURGERY | Facility: CLINIC | Age: 39
End: 2024-05-06
Payer: MEDICAID

## 2024-05-06 ENCOUNTER — OUTPATIENT (OUTPATIENT)
Dept: OUTPATIENT SERVICES | Facility: HOSPITAL | Age: 39
LOS: 1 days | End: 2024-05-06
Payer: MEDICAID

## 2024-05-06 VITALS
DIASTOLIC BLOOD PRESSURE: 75 MMHG | RESPIRATION RATE: 18 BRPM | SYSTOLIC BLOOD PRESSURE: 112 MMHG | HEIGHT: 62 IN | TEMPERATURE: 98 F | OXYGEN SATURATION: 98 % | WEIGHT: 174 LBS | BODY MASS INDEX: 32.02 KG/M2 | HEART RATE: 111 BPM

## 2024-05-06 DIAGNOSIS — M43.17 SPONDYLOLISTHESIS, LUMBOSACRAL REGION: ICD-10-CM

## 2024-05-06 PROCEDURE — 72100 X-RAY EXAM L-S SPINE 2/3 VWS: CPT

## 2024-05-06 PROCEDURE — 72100 X-RAY EXAM L-S SPINE 2/3 VWS: CPT | Mod: 26

## 2024-05-06 PROCEDURE — 99024 POSTOP FOLLOW-UP VISIT: CPT

## 2024-05-06 NOTE — ASSESSMENT
[FreeTextEntry1] : stable post op recovery. Staples removed today without complications  PLAN - Xray LS AP/Lat today - f/u in one month (no images needed at that time)

## 2024-05-06 NOTE — REASON FOR VISIT
[de-identified] : L5-S1 bilaterallaminectomy with removal of fractured pars fragments and complete, facetectomy,Transforaminal decompression and complete diskectomy at L5-S1 [de-identified] : 4/24/2024

## 2024-05-06 NOTE — HISTORY OF PRESENT ILLNESS
[FreeTextEntry1] : She is doing well and denies fever/chills, n/v, cp, sob, dizziness, any focal neuro deficits. 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 In 2022 back pain started radiating down both legs to feet.   1/30/24 Pt complains of low back pain radiating down BL legs. Pt complains of pain, numbness and tingling in both legs down to toes. Pain started in left leg now is in both legs. Pt states she cannot sleep at night d/t pain and cannot lay flat on her back. Pt cannot sit or stand for too long. Pt denies any recent falls, bowel/bladder incontinence. Pt is ambulating today without assistive device. MRI and Xray Reviewed. L5 congenital spondylolysis reviewed.  She had been scheduled for L5-S1 ALIF with Dr. Contreras and presents today to establish care.   Lower back pain is constant. Reports she stumbles often with ambulation, denies any falls. Denies weakness in legs. Does not take anything pain. Pain worse with ambulation, improves with rest.  Given Image finding and current symptoms, elective surgery of L5-S1 decompression, interbody and posterior fusion.  Post op hospital stay was uneventful and was discharged to home on 4/29/2024.

## 2024-05-06 NOTE — PHYSICAL EXAM
[General Appearance - Alert] : alert [General Appearance - In No Acute Distress] : in no acute distress [General Appearance - Well Nourished] : well nourished [General Appearance - Well-Appearing] : healthy appearing [Longitudinal] : longitudinal [No Drainage] : without drainage [Normal Skin] : normal [Erythema] : not erythematous [Tender] : not tender [Warm] : not warm [Indurated] : not indurated [Fluctuant] : not fluctuant [FreeTextEntry1] : lower back [Oriented To Time, Place, And Person] : oriented to person, place, and time [Impaired Insight] : insight and judgment were intact [Affect] : the affect was normal [Memory Recent] : recent memory was not impaired [Abnormal Walk] : normal gait

## 2024-06-02 PROBLEM — Z78.9 NONSMOKER: Status: ACTIVE | Noted: 2024-01-30

## 2024-06-02 PROBLEM — Q76.2 CONGENITAL SPONDYLOLYSIS: Status: ACTIVE | Noted: 2024-01-30

## 2024-06-02 PROBLEM — M54.17 LUMBOSACRAL RADICULOPATHY AT L5: Status: ACTIVE | Noted: 2023-10-18

## 2024-06-02 PROBLEM — Z98.890 S/P LUMBAR LAMINECTOMY: Status: ACTIVE | Noted: 2024-06-02

## 2024-06-02 PROBLEM — Z98.1 S/P LUMBAR SPINAL FUSION: Status: ACTIVE | Noted: 2024-05-02

## 2024-06-03 ENCOUNTER — APPOINTMENT (OUTPATIENT)
Dept: NEUROSURGERY | Facility: CLINIC | Age: 39
End: 2024-06-03
Payer: MEDICAID

## 2024-06-03 VITALS
HEART RATE: 96 BPM | TEMPERATURE: 97.2 F | BODY MASS INDEX: 30.91 KG/M2 | OXYGEN SATURATION: 98 % | SYSTOLIC BLOOD PRESSURE: 113 MMHG | DIASTOLIC BLOOD PRESSURE: 77 MMHG | HEIGHT: 62 IN | RESPIRATION RATE: 18 BRPM | WEIGHT: 168 LBS

## 2024-06-03 DIAGNOSIS — Z98.1 ARTHRODESIS STATUS: ICD-10-CM

## 2024-06-03 DIAGNOSIS — Z78.9 OTHER SPECIFIED HEALTH STATUS: ICD-10-CM

## 2024-06-03 DIAGNOSIS — Z98.890 OTHER SPECIFIED POSTPROCEDURAL STATES: ICD-10-CM

## 2024-06-03 DIAGNOSIS — M54.17 RADICULOPATHY, LUMBOSACRAL REGION: ICD-10-CM

## 2024-06-03 DIAGNOSIS — Q76.2 CONGENITAL SPONDYLOLISTHESIS: ICD-10-CM

## 2024-06-03 PROCEDURE — 99024 POSTOP FOLLOW-UP VISIT: CPT

## 2024-06-03 NOTE — PHYSICAL EXAM
[General Appearance - Alert] : alert [General Appearance - In No Acute Distress] : in no acute distress [General Appearance - Well Nourished] : well nourished [General Appearance - Well-Appearing] : healthy appearing [Longitudinal] : longitudinal [Well-Healed] : well-healed [No Drainage] : without drainage [Normal Skin] : normal [Oriented To Time, Place, And Person] : oriented to person, place, and time [Impaired Insight] : insight and judgment were intact [Affect] : the affect was normal [Memory Recent] : recent memory was not impaired [Abnormal Walk] : normal gait [Erythema] : not erythematous [Tender] : not tender [Warm] : not warm [Indurated] : not indurated [Fluctuant] : not fluctuant [FreeTextEntry1] : lower back

## 2024-06-03 NOTE — HISTORY OF PRESENT ILLNESS
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 In 2022 back pain started radiating down both legs to feet.   1/30/24 Pt complains of low back pain radiating down BL legs. Pt complains of pain, numbness and tingling in both legs down to toes. Pain started in left leg now is in both legs. Pt states she cannot sleep at night d/t pain and cannot lay flat on her back. Pt cannot sit or stand for too long. Pt denies any recent falls, bowel/bladder incontinence. Pt is ambulating today without assistive device. MRI and Xray Reviewed. L5 congenital spondylolysis reviewed.  She had been scheduled for L5-S1 ALIF with Dr. Contreras and presents today to establish care.   Lower back pain is constant. Reports she stumbles often with ambulation, denies any falls. Denies weakness in legs. Does not take anything pain. Pain worse with ambulation, improves with rest.  Given Image finding and current symptoms, elective surgery of L5-S1 decompression, interbody and posterior fusion.  Post op hospital stay was uneventful and was discharged to home on 4/29/2024.  5/6/24 post op visit She is doing well and denies fever/chills, n/v, cp, sob, dizziness, any focal neuro deficits. Plan was made to obtain Xray LS AP/Lat today to reevaluate fusion as post op and follow up in one month to reassess progress [FreeTextEntry1] : Today she reports moderate intermittent back pain worsening by turning on her side at night, sitting/standing. She has been wearing back brace which helps for moderate relief of her back pain. She denies any other focal neuro deficits.

## 2024-06-03 NOTE — DATA REVIEWED
[de-identified] : L-spine AP/Lat on 5/6/24 in PACS ACC: [id number]     EXAM:  XR LS SPINE AP LAT 2-3 VIEWS   ORDERED BY: PATRICA REMY  PROCEDURE DATE:  05/06/2024    INTERPRETATION:  Clinical indications: Postoperative evaluation of lumbar spinal fusion.  AP and lateral radiographs of the lumbar spine were performed.  Correlation is made with prior radiographs from April 27, 2024.  FINDINGS:  Patient is status post posterior decompression and circumferential spinal fusion at L5-S1. Hardware appears grossly unchanged in appearance. Interval removal of cutaneous surgical staples. Sclerosis and osteophyte formation is seen about L5-S1. Vertebral body heights are intact. Intrauterine device.  IMPRESSION:  Unchanged posterior decompression and circumferential spinal fusion at L5-S1.  --- End of Report ---      JUDIT GARDNER MD; Attending Radiologist This document has been electronically signed. May 10 2024  9:14AM

## 2024-06-03 NOTE — REASON FOR VISIT
[de-identified] : L5-S1 bilaterallaminectomy with removal of fractured pars fragments and complete, facetectomy,Transforaminal decompression and complete diskectomy, interbody fusion and posterior fusion at L5-S1 [de-identified] : 4/24/2024

## 2024-06-03 NOTE — ASSESSMENT
[FreeTextEntry1] : stable post op recovery, doing well. Radicular leg pain and paresthesias from pre-op have resolved. Back pain much improved compared to pre-op.  PLAN - continue LSO brace PRN back support/pain - repeat CT L-spine wo in 3 months (late July-early Aug 2024) to reevaluate fusion - f/u after images to review

## 2024-07-15 ENCOUNTER — APPOINTMENT (OUTPATIENT)
Dept: CT IMAGING | Facility: CLINIC | Age: 39
End: 2024-07-15
Payer: MEDICAID

## 2024-07-15 ENCOUNTER — RESULT REVIEW (OUTPATIENT)
Age: 39
End: 2024-07-15

## 2024-07-15 PROCEDURE — 72131 CT LUMBAR SPINE W/O DYE: CPT

## 2024-08-29 PROBLEM — Z86.2 HISTORY OF SICKLE CELL TRAIT: Status: RESOLVED | Noted: 2023-10-18 | Resolved: 2024-08-29

## 2024-08-30 ENCOUNTER — APPOINTMENT (OUTPATIENT)
Dept: NEUROSURGERY | Facility: CLINIC | Age: 39
End: 2024-08-30

## 2024-08-30 VITALS
HEIGHT: 61 IN | RESPIRATION RATE: 18 BRPM | DIASTOLIC BLOOD PRESSURE: 71 MMHG | SYSTOLIC BLOOD PRESSURE: 102 MMHG | TEMPERATURE: 97.5 F | OXYGEN SATURATION: 99 % | BODY MASS INDEX: 36.25 KG/M2 | HEART RATE: 89 BPM | WEIGHT: 192 LBS

## 2024-08-30 DIAGNOSIS — Z98.1 ARTHRODESIS STATUS: ICD-10-CM

## 2024-08-30 DIAGNOSIS — Z78.9 OTHER SPECIFIED HEALTH STATUS: ICD-10-CM

## 2024-08-30 DIAGNOSIS — Z86.2 PERSONAL HISTORY OF DISEASES OF THE BLOOD AND BLOOD-FORMING ORGANS AND CERTAIN DISORDERS INVOLVING THE IMMUNE MECHANISM: ICD-10-CM

## 2024-08-30 DIAGNOSIS — Z98.890 OTHER SPECIFIED POSTPROCEDURAL STATES: ICD-10-CM

## 2024-08-30 DIAGNOSIS — M54.17 RADICULOPATHY, LUMBOSACRAL REGION: ICD-10-CM

## 2024-08-30 DIAGNOSIS — M43.17 SPONDYLOLISTHESIS, LUMBOSACRAL REGION: ICD-10-CM

## 2024-08-30 PROCEDURE — 99215 OFFICE O/P EST HI 40 MIN: CPT

## 2024-08-30 NOTE — DATA REVIEWED
[de-identified] : L-spine wo on 7/15/24 in PACS EXAM: [id number] - CT LUMBAR SPINE  - ORDERED BY: PATRICA REMY   PROCEDURE DATE:  07/15/2024    INTERPRETATION:  EXAMINATION: CT of the lumbar spine without contrast  HISTORY: Status post L5-S1 fusion.  TECHNIQUE: CT of the lumbar spine was performed according to standard protocol without intravenous contrast.  COMPARISON: Comparison CT dated 2/6/2024  FINDINGS:  BONES: Status post posterior instrumented fusion at L5-S1 with posterior decompression. No acute displaced fracture.  ALIGNMENT: Grade 2 anterolisthesis of L5 on S1.  DISCS: Discectomy with interbody fusion at L5-S1. No significant disc space height loss throughout the remainder of the lumbar spine.  SOFT TISSUES/RETROPERITONEUM: Soft tissue stranding within the posterior paraspinal musculature/posterior subcutaneous tissues along the lumbar spine, most prominent at the level of the surgery. Partially evaluated IUD noted.  L1-L2: No canal or neuroforaminal stenosis.  L2-L3: No canal or neuroforaminal stenosis.  L3-L4: No canal or neuroforaminal stenosis.  L4-L5: No canal or neuroforaminal stenosis.  L5-S1: Status post discectomy with interbody fusion with posterior decompression. Status post probable bilateral foraminotomies. Streak artifact from the patient's instrumentation limits evaluation of the neural foramina and central canal at this level.  IMPRESSION:  Status post circumferential fusion with posterior decompression at L5-S1. No evidence of hardware complication. Streak artifact from patient's instrumentation limits evaluation of the neural foramina and spinal canal at the level of L5-S1. MRI would be a better test to evaluate the patency of the central canal and neuroforaminal at L5-S1.  No significant neural foraminal or central canal narrowing throughout the remainder of the lumbar spine.  --- End of Report ---       GAMA ARAUZ M.D., ATTENDING RADIOLOGIST This document has been electronically signed. Jul 15 2024  3:12PM

## 2024-08-30 NOTE — HISTORY OF PRESENT ILLNESS
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 In 2022 back pain started radiating down both legs to feet.   1/30/24 Pt complains of low back pain radiating down BL legs. Pt complains of pain, numbness and tingling in both legs down to toes. Pain started in left leg now is in both legs. Pt states she cannot sleep at night d/t pain and cannot lay flat on her back. Pt cannot sit or stand for too long. Pt denies any recent falls, bowel/bladder incontinence. Pt is ambulating today without assistive device. MRI and Xray Reviewed. L5 congenital spondylolysis reviewed.  She had been scheduled for L5-S1 ALIF with Dr. Contreras and presents today to establish care.   Lower back pain is constant. Reports she stumbles often with ambulation, denies any falls. Denies weakness in legs. Does not take anything pain. Pain worse with ambulation, improves with rest.  Given Image finding and current symptoms, elective surgery of L5-S1 decompression, interbody and posterior fusion.  Post op hospital stay was uneventful and was discharged to home on 4/29/2024.  5/6/24 post op visit She is doing well and denies fever/chills, n/v, cp, sob, dizziness, any focal neuro deficits. Plan was made to obtain Xray LS AP/Lat today to reevaluate fusion as post op and follow up in one month to reassess progress  6/3/2024 post op visit Today she reports moderate intermittent back pain worsening by turning on her side at night, sitting/standing. She has been wearing back brace which helps for moderate relief of her back pain. She denies any other focal neuro deficits. Xray reviewed which showed stable post op fusion and plan was made to obtain CT L-spine wo in Aug 2024 as 3 month follow up evaluation and return after.

## 2024-08-30 NOTE — DATA REVIEWED
[de-identified] : L-spine wo on 7/15/24 in PACS EXAM: [id number] - CT LUMBAR SPINE  - ORDERED BY: PATRICA REMY   PROCEDURE DATE:  07/15/2024    INTERPRETATION:  EXAMINATION: CT of the lumbar spine without contrast  HISTORY: Status post L5-S1 fusion.  TECHNIQUE: CT of the lumbar spine was performed according to standard protocol without intravenous contrast.  COMPARISON: Comparison CT dated 2/6/2024  FINDINGS:  BONES: Status post posterior instrumented fusion at L5-S1 with posterior decompression. No acute displaced fracture.  ALIGNMENT: Grade 2 anterolisthesis of L5 on S1.  DISCS: Discectomy with interbody fusion at L5-S1. No significant disc space height loss throughout the remainder of the lumbar spine.  SOFT TISSUES/RETROPERITONEUM: Soft tissue stranding within the posterior paraspinal musculature/posterior subcutaneous tissues along the lumbar spine, most prominent at the level of the surgery. Partially evaluated IUD noted.  L1-L2: No canal or neuroforaminal stenosis.  L2-L3: No canal or neuroforaminal stenosis.  L3-L4: No canal or neuroforaminal stenosis.  L4-L5: No canal or neuroforaminal stenosis.  L5-S1: Status post discectomy with interbody fusion with posterior decompression. Status post probable bilateral foraminotomies. Streak artifact from the patient's instrumentation limits evaluation of the neural foramina and central canal at this level.  IMPRESSION:  Status post circumferential fusion with posterior decompression at L5-S1. No evidence of hardware complication. Streak artifact from patient's instrumentation limits evaluation of the neural foramina and spinal canal at the level of L5-S1. MRI would be a better test to evaluate the patency of the central canal and neuroforaminal at L5-S1.  No significant neural foraminal or central canal narrowing throughout the remainder of the lumbar spine.  --- End of Report ---       GAMA ARAUZ M.D., ATTENDING RADIOLOGIST This document has been electronically signed. Jul 15 2024  3:12PM

## 2024-08-30 NOTE — REASON FOR VISIT
[Follow-Up: _____] : a [unfilled] follow-up visit [FreeTextEntry1] : s/p L5-S1 bilaterallaminectomy with removal of fractured pars fragments and complete, facetectomy,Transforaminal decompression and complete diskectomy, interbody fusion and posterior fusion at L5-S1 on 4/24/24 Returns to review 3 month post op follow up CT L-spine wo (PACS)

## 2024-08-30 NOTE — ASSESSMENT
[FreeTextEntry1] : stable interbody/posterior fusion.   PLAN - physical therapy for back muscle spasm - f/u in 6 months, Feb 2025 (Xray L-spine AP/Lat/F/E at that time)  I, Dr. Sav Newell, personally performed the evaluation and management (E/M) services for this established patient who presents today with (a) new problem(s)/exacerbation of (an) existing condition(s). That E/M includes conducting the clinically appropriate interval history &/or exam, assessing all new/exacerbated conditions, and establishing a new plan of care. Today, my ALONZO, Hyunchu Laura-Gold, was here to observe my evaluation and management service for this new problem/exacerbated condition and follow the plan of care established by me going forward.

## 2024-08-30 NOTE — HISTORY OF PRESENT ILLNESS
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 In 2022 back pain started radiating down both legs to feet.   1/30/24 Pt complains of low back pain radiating down BL legs. Pt complains of pain, numbness and tingling in both legs down to toes. Pain started in left leg now is in both legs. Pt states she cannot sleep at night d/t pain and cannot lay flat on her back. Pt cannot sit or stand for too long. Pt denies any recent falls, bowel/bladder incontinence. Pt is ambulating today without assistive device. MRI and Xray Reviewed. L5 congenital spondylolysis reviewed.  She had been scheduled for L5-S1 ALIF with Dr. Contreras and presents today to establish care.   Lower back pain is constant. Reports she stumbles often with ambulation, denies any falls. Denies weakness in legs. Does not take anything pain. Pain worse with ambulation, improves with rest.  Given Image finding and current symptoms, elective surgery of L5-S1 decompression, interbody and posterior fusion.  Post op hospital stay was uneventful and was discharged to home on 4/29/2024.  5/6/24 post op visit She is doing well and denies fever/chills, n/v, cp, sob, dizziness, any focal neuro deficits. Plan was made to obtain Xray LS AP/Lat today to reevaluate fusion as post op and follow up in one month to reassess progress  6/3/2024 post op visit Today she reports moderate intermittent back pain worsening by turning on her side at night, sitting/standing. She has been wearing back brace which helps for moderate relief of her back pain. She denies any other focal neuro deficits. Xray reviewed which showed stable post op fusion and plan was made to obtain CT L-spine wo in Aug 2024 as 3 month follow up evaluation and return after.

## 2024-10-24 PROBLEM — Z86.2 HISTORY OF SICKLE CELL TRAIT: Status: RESOLVED | Noted: 2023-10-18 | Resolved: 2024-10-24

## 2024-11-01 ENCOUNTER — APPOINTMENT (OUTPATIENT)
Dept: NEUROSURGERY | Facility: CLINIC | Age: 39
End: 2024-11-01

## 2024-11-01 ENCOUNTER — OUTPATIENT (OUTPATIENT)
Dept: OUTPATIENT SERVICES | Facility: HOSPITAL | Age: 39
LOS: 1 days | End: 2024-11-01
Payer: MEDICAID

## 2024-11-01 VITALS
RESPIRATION RATE: 18 BRPM | WEIGHT: 192 LBS | OXYGEN SATURATION: 98 % | DIASTOLIC BLOOD PRESSURE: 68 MMHG | SYSTOLIC BLOOD PRESSURE: 103 MMHG | BODY MASS INDEX: 36.25 KG/M2 | HEIGHT: 61 IN | HEART RATE: 83 BPM

## 2024-11-01 DIAGNOSIS — M54.17 RADICULOPATHY, LUMBOSACRAL REGION: ICD-10-CM

## 2024-11-01 DIAGNOSIS — Z86.2 PERSONAL HISTORY OF DISEASES OF THE BLOOD AND BLOOD-FORMING ORGANS AND CERTAIN DISORDERS INVOLVING THE IMMUNE MECHANISM: ICD-10-CM

## 2024-11-01 DIAGNOSIS — M43.17 SPONDYLOLISTHESIS, LUMBOSACRAL REGION: ICD-10-CM

## 2024-11-01 DIAGNOSIS — Z78.9 OTHER SPECIFIED HEALTH STATUS: ICD-10-CM

## 2024-11-01 DIAGNOSIS — Z98.890 OTHER SPECIFIED POSTPROCEDURAL STATES: ICD-10-CM

## 2024-11-01 DIAGNOSIS — Z98.891 HISTORY OF UTERINE SCAR FROM PREVIOUS SURGERY: Chronic | ICD-10-CM

## 2024-11-01 DIAGNOSIS — Z98.1 ARTHRODESIS STATUS: ICD-10-CM

## 2024-11-01 PROCEDURE — 72110 X-RAY EXAM L-2 SPINE 4/>VWS: CPT | Mod: 26

## 2024-11-01 PROCEDURE — 72110 X-RAY EXAM L-2 SPINE 4/>VWS: CPT

## 2024-11-01 PROCEDURE — 99215 OFFICE O/P EST HI 40 MIN: CPT

## (undated) DEVICE — MARKING PEN W RULER

## (undated) DEVICE — MIDAS REX MR8 METAL CUTTER 3MM X 9CM

## (undated) DEVICE — DRSG BIOPATCH DISK W CHG 1" W 4.0MM HOLE

## (undated) DEVICE — POSITIONER FOAM EGG CRATE ULNAR 2PCS (PINK)

## (undated) DEVICE — DRAIN JACKSON PRATT 3 SPRING RESERVOIR W 10FR PVC DRAIN

## (undated) DEVICE — ELCTR AQUAMANTYS BIPOLAR SEALER 6.0

## (undated) DEVICE — SUT VICRYL PLUS 2-0 18" CT-2 (POP-OFF)

## (undated) DEVICE — DRSG DERMABOND 0.7ML

## (undated) DEVICE — DRAPE C ARM 41X74"

## (undated) DEVICE — FOLEY TRAY 16FR 5CC LF UMETER CLOSED

## (undated) DEVICE — DRSG TEGADERM 4X4.75"

## (undated) DEVICE — DRAPE MICROSCOPE EXOSCOPE 12" X 79"

## (undated) DEVICE — DRAPE INSTRUMENT POUCH 6.75" X 11"

## (undated) DEVICE — DRAPE MICROSCOPE LEICA MINI

## (undated) DEVICE — MIDAS REX MR8 BALL FLUTED SM BORE 3MM X 10CM

## (undated) DEVICE — STRYKER BONE MILL BLADE FINE 3.2MM

## (undated) DEVICE — NDL HYPO SAFE 22G X 1.5" (BLACK)

## (undated) DEVICE — MIDAS REX MR8 MATCH HEAD FLUTED SM BORE 3MM X 10CM

## (undated) DEVICE — PACK SPINE

## (undated) DEVICE — VENODYNE/SCD SLEEVE CALF MEDIUM

## (undated) DEVICE — DRAPE C ARM BANDED SHOWER BAG

## (undated) DEVICE — SPONGE SURGICAL STRIP 1/2 X 6"

## (undated) DEVICE — SUT VICRYL 0 18" CT-1 UNDYED (POP-OFF)

## (undated) DEVICE — MIDAS REX MR8 MATCH HEAD FLUTED LG BORE 3MM X 14CM

## (undated) DEVICE — SUT SILK 2-0 30" PSL

## (undated) DEVICE — SUT MONOCRYL 3-0 27" PS-2 UNDYED

## (undated) DEVICE — GLV 8 PROTEXIS (WHITE)

## (undated) DEVICE — DRSG 4 X 8

## (undated) DEVICE — WARMING BLANKET UPPER ADULT